# Patient Record
Sex: MALE | Race: WHITE | NOT HISPANIC OR LATINO | Employment: OTHER | URBAN - METROPOLITAN AREA
[De-identification: names, ages, dates, MRNs, and addresses within clinical notes are randomized per-mention and may not be internally consistent; named-entity substitution may affect disease eponyms.]

---

## 2017-01-24 ENCOUNTER — TRANSFERRED RECORDS (OUTPATIENT)
Dept: HEALTH INFORMATION MANAGEMENT | Facility: CLINIC | Age: 42
End: 2017-01-24

## 2017-05-09 ENCOUNTER — OFFICE VISIT (OUTPATIENT)
Dept: FAMILY MEDICINE | Facility: CLINIC | Age: 42
End: 2017-05-09
Payer: MEDICAID

## 2017-05-09 VITALS
BODY MASS INDEX: 24.66 KG/M2 | TEMPERATURE: 96.9 F | WEIGHT: 134 LBS | HEART RATE: 74 BPM | SYSTOLIC BLOOD PRESSURE: 117 MMHG | HEIGHT: 62 IN | DIASTOLIC BLOOD PRESSURE: 77 MMHG

## 2017-05-09 DIAGNOSIS — E55.9 VITAMIN D DEFICIENCY: ICD-10-CM

## 2017-05-09 DIAGNOSIS — Z00.00 ROUTINE GENERAL MEDICAL EXAMINATION AT A HEALTH CARE FACILITY: Primary | ICD-10-CM

## 2017-05-09 LAB
BASOPHILS # BLD AUTO: 0 10E9/L (ref 0–0.2)
BASOPHILS NFR BLD AUTO: 0.2 %
DIFFERENTIAL METHOD BLD: ABNORMAL
EOSINOPHIL # BLD AUTO: 0.1 10E9/L (ref 0–0.7)
EOSINOPHIL NFR BLD AUTO: 1.7 %
ERYTHROCYTE [DISTWIDTH] IN BLOOD BY AUTOMATED COUNT: 11.5 % (ref 10–15)
HCT VFR BLD AUTO: 39.6 % (ref 40–53)
HGB BLD-MCNC: 13.6 G/DL (ref 13.3–17.7)
LYMPHOCYTES # BLD AUTO: 1.6 10E9/L (ref 0.8–5.3)
LYMPHOCYTES NFR BLD AUTO: 34.4 %
MCH RBC QN AUTO: 33.1 PG (ref 26.5–33)
MCHC RBC AUTO-ENTMCNC: 34.3 G/DL (ref 31.5–36.5)
MCV RBC AUTO: 96 FL (ref 78–100)
MONOCYTES # BLD AUTO: 0.5 10E9/L (ref 0–1.3)
MONOCYTES NFR BLD AUTO: 11.3 %
NEUTROPHILS # BLD AUTO: 2.5 10E9/L (ref 1.6–8.3)
NEUTROPHILS NFR BLD AUTO: 52.4 %
PLATELET # BLD AUTO: 136 10E9/L (ref 150–450)
RBC # BLD AUTO: 4.11 10E12/L (ref 4.4–5.9)
WBC # BLD AUTO: 4.7 10E9/L (ref 4–11)

## 2017-05-09 PROCEDURE — 99396 PREV VISIT EST AGE 40-64: CPT | Performed by: FAMILY MEDICINE

## 2017-05-09 PROCEDURE — 83721 ASSAY OF BLOOD LIPOPROTEIN: CPT | Performed by: FAMILY MEDICINE

## 2017-05-09 PROCEDURE — 84443 ASSAY THYROID STIM HORMONE: CPT | Performed by: FAMILY MEDICINE

## 2017-05-09 PROCEDURE — 80048 BASIC METABOLIC PNL TOTAL CA: CPT | Performed by: FAMILY MEDICINE

## 2017-05-09 PROCEDURE — 84439 ASSAY OF FREE THYROXINE: CPT | Performed by: FAMILY MEDICINE

## 2017-05-09 PROCEDURE — 85025 COMPLETE CBC W/AUTO DIFF WBC: CPT | Performed by: FAMILY MEDICINE

## 2017-05-09 PROCEDURE — 36415 COLL VENOUS BLD VENIPUNCTURE: CPT | Performed by: FAMILY MEDICINE

## 2017-05-09 RX ORDER — CHOLECALCIFEROL (VITAMIN D3) 50 MCG
2000 TABLET ORAL DAILY
Qty: 100 TABLET | Refills: 3 | Status: SHIPPED | OUTPATIENT
Start: 2017-05-09

## 2017-05-09 NOTE — PATIENT INSTRUCTIONS
1. I recommend including veggies, fresh fruits (3 to 5 servings), nuts (unsalted) in your daily diet. Cut down on carbs like bread, pasta, rice, potatoes. Limit red meats like burgers etc. Increase healthy proteins like beans, tofu, tuna, chicken and turkey.    2. Continue walking daily.     3. Apply Mineral Oil 2 drops each ear 3 times per week.    4. Avoid the sun or otherwise use sun screen - please look at the pamphlet I gave you about melanoma.    5. Have BB see the dentist and eye doctor yearly.    6. Find the vaccine records and mail them to me or drop them off at the .    7. Have BB take Vitamin D 2000 units daily.     8. I will mail the results of the blood test. I all is well, let me see BB in one year.

## 2017-05-09 NOTE — MR AVS SNAPSHOT
After Visit Summary   5/9/2017    Teto Stephens    MRN: [id number]           Patient Information     Date Of Birth          1975        Visit Information        Provider Department      5/9/2017 5:00 PM Carlitos Gtz MD [facility]        Today's Diagnoses     Routine general medical examination at a health care facility    -  1    Vitamin D deficiency          Care Instructions    1. I recommend including veggies, fresh fruits (3 to 5 servings), nuts (unsalted) in your daily diet. Cut down on carbs like bread, pasta, rice, potatoes. Limit red meats like burgers etc. Increase healthy proteins like beans, tofu, tuna, chicken and turkey.    2. Continue walking daily.     3. Apply Mineral Oil 2 drops each ear 3 times per week.    4. Avoid the sun or otherwise use sun screen - please look at the pamphlet I gave you about melanoma.    5. Have BB see the dentist and eye doctor yearly.    6. Find the vaccine records and mail them to me or drop them off at the .    7. Have BB take Vitamin D 2000 units daily.     8. I will mail the results of the blood test. I all is well, let me see BB in one year.           Follow-ups after your visit        Who to contact     If you have questions or need follow up information about today's clinic visit or your schedule please contact [facility] directly at [phone].  Normal or non-critical lab and imaging results will be communicated to you by MyChart, letter or phone within 4 business days after the clinic has received the results. If you do not hear from us within 7 days, please contact the clinic through MyChart or phone. If you have a critical or abnormal lab result, we will notify you by phone as soon as possible.  Submit refill requests through [employer] or call your pharmacy and they will forward the refill request to us. Please allow 3 business days for your refill to be completed.          Additional Information  "About Your Visit        [employer] Information     [employer] lets you send messages to your doctor, view your test results, renew your prescriptions, schedule appointments and more. To sign up, go to [email or web address] . Click on \"Log in\" on the left side of the screen, which will take you to the Welcome page. Then click on \"Sign up Now\" on the right side of the page.     You will be asked to enter the access code listed below, as well as some personal information. Please follow the directions to create your username and password.     Your access code is: [email or web address]  Expires: 2017  5:21 PM     Your access code will  in 90 days. If you need help or a new code, please call your [facility] or [phone].        Care EveryWhere ID     This is your Care EveryWhere ID. This could be used by other organizations to access your [facility] medical records  [id number]        Your Vitals Were     Pulse Temperature Height BMI (Body Mass Index)          74 96.9  F (36.1  C) (Tympanic) 5' 2.25\" (1.581 m) 24.31 kg/m2         Blood Pressure from Last 3 Encounters:   17 117/77   16 130/80   08/14/15 120/58    Weight from Last 3 Encounters:   17 134 lb (60.8 kg)   16 133 lb (60.3 kg)   08/14/15 133 lb (60.3 kg)              We Performed the Following     Basic metabolic panel     CBC with platelets differential     LDL cholesterol direct     TSH with free T4 reflex          Where to get your medicines      These medications were sent to [facility] [address] [facility]  [place] [facility] AT [address]   [place] [facility] [address]     Phone:  [phone]     vitamin D 2000 UNITS tablet          Primary Care Provider Office Phone # Fax #    [facility] [phone]       [address] [facility]. [facility] [address]        Thank you!     Thank you for choosing [facility]  for your care. Our goal is always to " provide you with excellent care. Hearing back from our patients is one way we can continue to improve our services. Please take a few minutes to complete the written survey that you may receive in the mail after your visit with us. Thank you!             Your Updated Medication List - Protect others around you: Learn how to safely use, store and throw away your medicines at www.disposemymeds.org.          This list is accurate as of: 5/9/17  5:21 PM.  Always use your most recent med list.                   Brand Name Dispense Instructions for use    carBAMazepine 100 MG chewable tablet    TEGretol     100 mg 2 times daily 4 tablets twice daily.       cloNIDine 0.2 MG tablet    CATAPRES     Take 0.2 mg by mouth 3 times daily.       * DEPAKOTE 500 MG EC tablet   Generic drug:  divalproex      Take 500 mg by mouth 3 times daily. 625mg at noon       * divalproex 125 MG EC tablet    DEPAKOTE     125 mg Take 1 tablet once daily along with 500mg       LORazepam 1 MG tablet    ATIVAN     Take 1 mg by mouth 3 times daily.       NEW MED      Carbmepazine 100mg 4 bid       risperDAL 0.25 MG tablet   Generic drug:  risperiDONE      Take 0.25 mg by mouth 2 times daily.       vitamin D 2000 UNITS tablet     100 tablet    Take 2,000 Units by mouth daily       * Notice:  This list has 2 medication(s) that are the same as other medications prescribed for you. Read the directions carefully, and ask your doctor or other care provider to review them with you.

## 2017-05-09 NOTE — PROGRESS NOTES
SUBJECTIVE:     CC: Teto Stephens (AKA BB) is an 42 year old male who presents for preventative health visit.     Here with foster mom (his brother's wife). But he has adoptive mom who is involved in his care daily.    Autism, MR - non verbal. Needs full cares except he dresses himself. Generally cooperative, but sometimes can be challenging if he is agitated. Has certain behaviors like repetitive motions. Follows with psychiatry is on Ativan, Clonidine and Risperdal.    Dental infection - he had tooth extraction under anesthesia on 3/31/16.     Seizure d/o - follows with neurology. On Tegretol and Depakote.    H/O Diabetes Insipidus - previously on Lithium. Now resolved.    Ear wax - irrigated successfully previously. Counseled on using Debrox or Mineral oil to prevent recurrence.     Low Vit D - this was low on 5/12/14. Advised 2000 units daily.    Thrombocytopenia - this has been mild. Likely ITP. No issues with easy bruising or bleeding. No need for further investigation except follow periodically.    CBC RESULTS:   Recent Labs   Lab Test  03/18/16   1224   WBC  5.3   RBC  4.27*   HGB  14.0   HCT  40.8   MCV  96   MCH  32.8   MCHC  34.3   RDW  11.7   PLT  142*       CBC RESULTS:   Recent Labs    Lab Test  05/12/14  0825    WBC  4.2    RBC  4.29*    HGB  13.8    HCT  41.5    MCV  97    MCH  32.2    MCHC  33.3    RDW  12.3    PLT  134*        Preventive:    We don't have vaccine records yet but his foster mom says his vaccines are up to date. She will forward this from home.    Lipids:   Recent Labs    Lab Test  05/12/14  0825  05/18/13  1030    CHOL  162  179    HDL  54  53    LDL  93  104    TRIG  70  108    CHOLHDLRATIO  3.0  3.4      Diabetes test:     Last Basic Metabolic Panel:  NA      143   5/13/2015   POTASSIUM      4.0   5/13/2015  CHLORIDE      107   5/13/2015  CLAUDIO      9.4   5/13/2015  CO2       27   5/13/2015  BUN       12   5/13/2015  CR     0.82   5/13/2015  GLC       95    "5/13/2015    Lipids screen:    Recent Labs   Lab Test  05/12/14   0825  05/18/13   1030   CHOL  162  179   HDL  54  53   LDL  93  104   TRIG  70  108   CHOLHDLRATIO  3.0  3.4       Advanced Directive: discussed but declined    Today's PHQ-2 Score:   PHQ-2 ( 1999 Pf[employer]) 5/9/2017 5/13/2015   Q1: Little interest or pleasure in doing things 0 0   Q2: Feeling down, depressed or hopeless 0 0   PHQ-2 Score 0 0       Abuse: Current or Past(Physical, Sexual or Emotional)- No  Do you feel safe in your environment - Yes    Social History   Substance Use Topics     Smoking status: Never Smoker     Smokeless tobacco: Never Used     Alcohol use No     The patient does not drink >3 drinks per day nor >7 drinks per week.    Last PSA: No results found for: PSA    Recent Labs   Lab Test  05/12/14   0825  05/18/13   1030   CHOL  162  179   HDL  54  53   LDL  93  104   TRIG  70  108   CHOLHDLRATIO  3.0  3.4       Reviewed orders with patient. Reviewed health maintenance and updated orders accordingly - Yes    Reviewed and updated as needed this visit by clinical staff         Reviewed and updated as needed this visit by Provider            ROS:  C: NEGATIVE for fever, chills, change in weight  I: NEGATIVE for worrisome rashes, moles or lesions  E: NEGATIVE for vision changes or irritation  ENT: NEGATIVE for ear, mouth and throat problems  R: NEGATIVE for significant cough or SOB  CV: NEGATIVE for chest pain, palpitations or peripheral edema  GI: NEGATIVE for nausea, abdominal pain, heartburn, or change in bowel habits   male: negative for dysuria, hematuria, decreased urinary stream, erectile dysfunction, urethral discharge  M: NEGATIVE for significant arthralgias or myalgia  N: NEGATIVE for weakness, dizziness or paresthesias  P: NEGATIVE for changes in mood or affect      OBJECTIVE:     /77 (Cuff Size: Adult Regular)  Pulse 74  Temp 96.9  F (36.1  C) (Tympanic)  Ht 5' 2.25\" (1.581 m)  Wt 134 lb (60.8 kg)  BMI 24.31 " "kg/m2  EXAM:  GENERAL APPEARANCE: autistic patient, non verbal, with typical mannerism, cooperative in no distress. Here with his foster mom and brother - they are good about calming him down.   EYES: Eyes grossly normal to inspection, PERRL and conjunctivae and sclerae normal. There is strabismus.  HENT: ear canals with minimal wax and TM's normal, nose and mouth without ulcers or lesions  NECK: no adenopathy, no asymmetry, masses, or scars and thyroid normal to palpation  RESP: lungs clear to auscultation - no rales, rhonchi or wheezes  CV: regular rate and rhythm, normal S1 S2, no S3 or S4, no murmur, click or rub, no peripheral edema and peripheral pulses strong  ABDOMEN: soft, nontender, no hepatosplenomegaly, no masses and bowel sounds normal   (male): normal male genitalia without lesions or urethral discharge, no hernia  MS: no gross musculoskeletal defects noted, no edema  SKIN: no suspicious lesions or rashes  NEURO: Normal strength and tone, mentation intact and speech normal  NEURO: autistic patient, non verbal, with typical mannerism, cooperative in no distress.  PSYCH: mentation appears at baseline, affect normal.      ASSESSMENT/PLAN:         COUNSELING:  Reviewed preventive health counseling, as reflected in patient instructions       Regular exercise       Healthy diet/nutrition       reports that he has never smoked. He has never used smokeless tobacco.    Estimated body mass index is 24.33 kg/(m^2) as calculated from the following:    Height as of 3/18/16: 5' 2\" (1.575 m).    Weight as of 3/18/16: 133 lb (60.3 kg).         Assessment and Plan - Decision Making    1. Routine general medical examination at a health care facility    Results of today's visit given to patient's foster mom verbally and written instructions given. Various age appropriate preventive issues discussed.     - TSH with free T4 reflex  - CBC with platelets differential  - Basic metabolic panel  - LDL cholesterol direct    2. " Vitamin D deficiency    - Cholecalciferol (VITAMIN D) 2000 UNITS tablet; Take 2,000 Units by mouth daily  Dispense: 100 tablet; Refill: 3      Written instructions given as follows:    Patient Instructions   1. I recommend including veggies, fresh fruits (3 to 5 servings), nuts (unsalted) in your daily diet. Cut down on carbs like bread, pasta, rice, potatoes. Limit red meats like burgers etc. Increase healthy proteins like beans, tofu, tuna, chicken and turkey.    2. Continue walking daily.     3. Apply Mineral Oil 2 drops each ear 3 times per week.    4. Avoid the sun or otherwise use sun screen - please look at the pamphlet I gave you about melanoma.    5. Have BB see the dentist and eye doctor yearly.    6. Find the vaccine records and mail them to me or drop them off at the .    7. Have BB take Vitamin D 2000 units daily.     8. I will mail the results of the blood test. I all is well, let me see BB in one year.

## 2017-05-09 NOTE — NURSING NOTE
"Chief Complaint   Patient presents with     Physical       Initial /77 (Cuff Size: Adult Regular)  Pulse 74  Temp 96.9  F (36.1  C) (Tympanic)  Ht 5' 2.25\" (1.581 m)  Wt 134 lb (60.8 kg)  BMI 24.31 kg/m2 Estimated body mass index is 24.31 kg/(m^2) as calculated from the following:    Height as of this encounter: 5' 2.25\" (1.581 m).    Weight as of this encounter: 134 lb (60.8 kg).  Medication Reconciliation: complete    Chandrika Orantes LPN    "

## 2017-05-10 LAB
ANION GAP SERPL CALCULATED.3IONS-SCNC: 8 MMOL/L (ref 3–14)
BUN SERPL-MCNC: 19 MG/DL (ref 7–30)
CALCIUM SERPL-MCNC: 8.7 MG/DL (ref 8.5–10.1)
CHLORIDE SERPL-SCNC: 106 MMOL/L (ref 94–109)
CO2 SERPL-SCNC: 27 MMOL/L (ref 20–32)
CREAT SERPL-MCNC: 0.83 MG/DL (ref 0.66–1.25)
GFR SERPL CREATININE-BSD FRML MDRD: NORMAL ML/MIN/1.7M2
GLUCOSE SERPL-MCNC: 80 MG/DL (ref 70–99)
LDLC SERPL DIRECT ASSAY-MCNC: 97 MG/DL
POTASSIUM SERPL-SCNC: 4.2 MMOL/L (ref 3.4–5.3)
SODIUM SERPL-SCNC: 141 MMOL/L (ref 133–144)
T4 FREE SERPL-MCNC: 0.62 NG/DL (ref 0.76–1.46)
TSH SERPL DL<=0.005 MIU/L-ACNC: 4.64 MU/L (ref 0.4–4)

## 2017-05-11 ENCOUNTER — TELEPHONE (OUTPATIENT)
Dept: FAMILY MEDICINE | Facility: CLINIC | Age: 42
End: 2017-05-11

## 2017-05-11 DIAGNOSIS — E03.9 HYPOTHYROIDISM, UNSPECIFIED TYPE: Primary | ICD-10-CM

## 2017-05-11 NOTE — TELEPHONE ENCOUNTER
Please call his Foster Mom (also his sister-in-law).    The thyroid was low. Please set up an appointment with endocrinology - [place] [phone]. This is not urgent so it doesn't have to be right away.    All other labs were stable compared to previous.    Carlitos Gtz M.D.

## 2017-07-25 ENCOUNTER — TRANSFERRED RECORDS (OUTPATIENT)
Dept: HEALTH INFORMATION MANAGEMENT | Facility: CLINIC | Age: 42
End: 2017-07-25

## 2017-08-23 ENCOUNTER — TELEPHONE (OUTPATIENT)
Dept: FAMILY MEDICINE | Facility: CLINIC | Age: 42
End: 2017-08-23

## 2017-08-23 NOTE — TELEPHONE ENCOUNTER
If the thyroid test was normal in July. No need to repeat. No need to see the endocrinologist.    Let's repeat the test when I see him again in 2018 for his routine physical.    Carlitos Gtz M.D.

## 2017-08-23 NOTE — TELEPHONE ENCOUNTER
Pt  calling in regards to a thyroid level. His psych did a level and it came out normal in July 27th , Was wondering if cesar would want to re due his level before he see a endocrinologist,.

## 2018-01-19 ENCOUNTER — TRANSFERRED RECORDS (OUTPATIENT)
Dept: HEALTH INFORMATION MANAGEMENT | Facility: CLINIC | Age: 43
End: 2018-01-19

## 2018-02-21 ENCOUNTER — TELEPHONE (OUTPATIENT)
Dept: FAMILY MEDICINE | Facility: CLINIC | Age: 43
End: 2018-02-21

## 2018-02-21 ENCOUNTER — ALLIED HEALTH/NURSE VISIT (OUTPATIENT)
Dept: NURSING | Facility: CLINIC | Age: 43
End: 2018-02-21
Payer: MEDICAID

## 2018-02-21 DIAGNOSIS — Z23 NEED FOR VACCINATION: Primary | ICD-10-CM

## 2018-02-21 PROCEDURE — 90471 IMMUNIZATION ADMIN: CPT

## 2018-02-21 PROCEDURE — 99207 ZZC NO CHARGE LOS: CPT

## 2018-02-21 PROCEDURE — 90715 TDAP VACCINE 7 YRS/> IM: CPT

## 2018-02-21 NOTE — TELEPHONE ENCOUNTER
Bridgett is calling would like to know how she would go about doing a hearing and vision test on patient since he is severely DD. Please call to discuss. Thank you.

## 2018-02-21 NOTE — TELEPHONE ENCOUNTER
Bridgett falcon parent is wondering the dates patient had Hep A/B and tetnas shot  Please call to advice  Thank you

## 2018-02-21 NOTE — PROGRESS NOTES
Screening Questionnaire for Adult Immunization    Are you sick today?   No   Do you have allergies to medications, food, a vaccine component or latex?   No   Have you ever had a serious reaction after receiving a vaccination?   No   Do you have a long-term health problem with heart disease, lung disease, asthma, kidney disease, metabolic disease (e.g. diabetes), anemia, or other blood disorder?   No   Do you have cancer, leukemia, HIV/AIDS, or any other immune system problem?   No   In the past 3 months, have you taken medications that affect  your immune system, such as prednisone, other steroids, or anticancer drugs; drugs for the treatment of rheumatoid arthritis, Crohn s disease, or psoriasis; or have you had radiation treatments?   No   Have you had a seizure, or a brain or other nervous system problem?   No   During the past year, have you received a transfusion of blood or blood     products, or been given immune (gamma) globulin or antiviral drug?   No   For women: Are you pregnant or is there a chance you could become        pregnant during the next month?   No   Have you received any vaccinations in the past 4 weeks?   No     Immunization questionnaire answers were all negative.        Per orders of Dr. YEE, injection of TDAP given by Annalee Jordan. Patient instructed to remain in clinic for 15 minutes afterwards, and to report any adverse reaction to me immediately.       Screening performed by Annalee Jordan on 2/21/2018 at 4:00 PM.

## 2018-02-21 NOTE — MR AVS SNAPSHOT
"              After Visit Summary   2018    Teto Stephens    MRN: [id number]           Patient Information     Date Of Birth          1975        Visit Information        Provider Department      2018 3:30 PM AN ANCILLARY [facility]        Today's Diagnoses     Need for vaccination    -  1       Follow-ups after your visit        Who to contact     If you have questions or need follow up information about today's clinic visit or your schedule please contact [facility] directly at [phone].  Normal or non-critical lab and imaging results will be communicated to you by MyChart, letter or phone within 4 business days after the clinic has received the results. If you do not hear from us within 7 days, please contact the clinic through [employer] or phone. If you have a critical or abnormal lab result, we will notify you by phone as soon as possible.  Submit refill requests through [employer] or call your pharmacy and they will forward the refill request to us. Please allow 3 business days for your refill to be completed.          Additional Information About Your Visit        MyChart Information     [employer] lets you send messages to your doctor, view your test results, renew your prescriptions, schedule appointments and more. To sign up, go to [email or web address] . Click on \"Log in\" on the left side of the screen, which will take you to the Welcome page. Then click on \"Sign up Now\" on the right side of the page.     You will be asked to enter the access code listed below, as well as some personal information. Please follow the directions to create your username and password.     Your access code is: [email or web address]  Expires: 2018  4:01 PM     Your access code will  in 90 days. If you need help or a new code, please call your [facility] or [phone].        Care EveryWhere ID     This is your Care EveryWhere ID. This could be used by other organizations to " access your [facility] medical records  [id number]         Blood Pressure from Last 3 Encounters:   05/09/17 117/77   03/18/16 130/80   08/14/15 120/58    Weight from Last 3 Encounters:   05/09/17 134 lb (60.8 kg)   03/18/16 133 lb (60.3 kg)   08/14/15 133 lb (60.3 kg)              We Performed the Following     1st  Administration  [20184]     TDAP VACCINE (ADACEL) [79755.002]        Primary Care Provider Office Phone # Fax #    Carlitos Gtz [phone] [address] [facility] [address]        Equal Access to Services     DEMI MERCEDES : Dorota mcclellano Andre, waricardoda leslye, qaybta kaalmada fer, jamey cantu . So [facility] [phone].    ATENCIÓN: Si habla español, tiene a may disposición servicios gratuitos de asistencia lingüística. Llame al [phone].    We comply with applicable federal civil rights laws and [place] laws. We do not discriminate on the basis of race, color, national origin, age, disability, sex, sexual orientation, or gender identity.            Thank you!     Thank you for choosing [facility]  for your care. Our goal is always to provide you with excellent care. Hearing back from our patients is one way we can continue to improve our services. Please take a few minutes to complete the written survey that you may receive in the mail after your visit with us. Thank you!             Your Updated Medication List - Protect others around you: Learn how to safely use, store and throw away your medicines at www.disposemymeds.org.          This list is accurate as of 2/21/18  4:01 PM.  Always use your most recent med list.                   Brand Name Dispense Instructions for use Diagnosis    carBAMazepine 100 MG chewable tablet    TEGretol     100 mg 2 times daily 4 tablets twice daily.        cloNIDine 0.2 MG tablet    CATAPRES     Take 0.2 mg by mouth 3 times daily.        * DEPAKOTE 500 MG DR tablet   Generic  drug:  divalproex sodium delayed-release      Take 500 mg by mouth 3 times daily. 625mg at noon        * divalproex sodium delayed-release 125 MG DR tablet    DEPAKOTE     125 mg Take 1 tablet once daily along with 500mg        LORazepam 1 MG tablet    ATIVAN     Take 1 mg by mouth 3 times daily.        NEW MED      Carbmepazine 100mg 4 bid        risperDAL 0.25 MG tablet   Generic drug:  risperiDONE      Take 0.25 mg by mouth 2 times daily.        vitamin D 2000 UNITS tablet     100 tablet    Take 2,000 Units by mouth daily    Vitamin D deficiency       * Notice:  This list has 2 medication(s) that are the same as other medications prescribed for you. Read the directions carefully, and ask your doctor or other care provider to review them with you.

## 2018-02-21 NOTE — TELEPHONE ENCOUNTER
Called and informed her that we do not have any records of these immunizations for him. I checked [facility] also and there are only flu shots in there.Elida Birmingham MA/TC

## 2018-02-22 NOTE — TELEPHONE ENCOUNTER
Called and informed Bridgett and gave her the phone numbers to set this up.Elida Birmingham MA/PETER

## 2018-07-31 NOTE — PROGRESS NOTES
"  SUBJECTIVE:   CC: Teto Stephens is an 43 year old male who presents for preventative health visit.     Healthy Habits:    Do you get at least three servings of calcium containing foods daily (dairy, green leafy vegetables, etc.)? {YES/NO, DAIRY INTAKE:015584::\"yes\"}    Amount of exercise or daily activities, outside of work: {AMOUNT EXERCISE:077087}    Problems taking medications regularly {Yes /No default:143141::\"No\"}    Medication side effects: {Yes /No default.:907049::\"No\"}    Have you had an eye exam in the past two years? {YESNOBLANK:142752}    Do you see a dentist twice per year? {YESNOBLANK:401395}    Do you have sleep apnea, excessive snoring or daytime drowsiness?{YESNOBLANK:211765}  {Outside tests to abstract? :084571}     {additional problems to add (Optional):334211}    Today's PHQ-2 Score:   PHQ-2 ( 1999 Pfizer) 5/9/2017 5/13/2015   Q1: Little interest or pleasure in doing things 0 0   Q2: Feeling down, depressed or hopeless 0 0   PHQ-2 Score 0 0     {PHQ-2 LOOK IN ASSESSMENTS :251233}  Abuse: Current or Past(Physical, Sexual or Emotional)- {YES/NO/NA:639346}  Do you feel safe in your environment - {YES/NO/NA:844704}    Social History   Substance Use Topics     Smoking status: Never Smoker     Smokeless tobacco: Never Used     Alcohol use No      If you drink alcohol do you typically have >3 drinks per day or >7 drinks per week? {ETOH :883638}                      Last PSA: No results found for: PSA    Reviewed orders with patient. Reviewed health maintenance and updated orders accordingly - {Yes/No:298094::\"Yes\"}  {Chronicprobdata (Optional):084977}    Reviewed and updated as needed this visit by clinical staff         Reviewed and updated as needed this visit by Provider        {HISTORY OPTIONS (Optional):668465}    ROS:  { :404665::\"CONSTITUTIONAL: NEGATIVE for fever, chills, change in weight\",\"INTEGUMENTARY/SKIN: NEGATIVE for worrisome rashes, moles or lesions\",\"EYES: NEGATIVE for vision " "changes or irritation\",\"ENT: NEGATIVE for ear, mouth and throat problems\",\"RESP: NEGATIVE for significant cough or SOB\",\"CV: NEGATIVE for chest pain, palpitations or peripheral edema\",\"GI: NEGATIVE for nausea, abdominal pain, heartburn, or change in bowel habits\",\" male: negative for dysuria, hematuria, decreased urinary stream, erectile dysfunction, urethral discharge\",\"MUSCULOSKELETAL: NEGATIVE for significant arthralgias or myalgia\",\"NEURO: NEGATIVE for weakness, dizziness or paresthesias\",\"PSYCHIATRIC: NEGATIVE for changes in mood or affect\"}    OBJECTIVE:   There were no vitals taken for this visit.  EXAM:  {Exam Choices:594448}    {Diagnostic Test Results (Optional):020083::\"Diagnostic Test Results:\",\"none \"}    ASSESSMENT/PLAN:   {Diag Picklist:776448}    COUNSELING:  {MALE COUNSELING MESSAGES:127290::\"Reviewed preventive health counseling, as reflected in patient instructions\"}    BP Readings from Last 1 Encounters:   05/09/17 117/77     Estimated body mass index is 24.31 kg/(m^2) as calculated from the following:    Height as of 5/9/17: 5' 2.25\" (1.581 m).    Weight as of 5/9/17: 134 lb (60.8 kg).    {BP Counseling- Complete if BP >= 120/80  (Optional):416236}  {Weight Management Plan (ACO) Complete if BMI is abnormal-  Ages 18-64  BMI >24.9.  Age 65+ with BMI <23 or >30 (Optional):260205}     reports that he has never smoked. He has never used smokeless tobacco.  {Tobacco Cessation -- Complete if patient is a smoker (Optional):948641}    Counseling Resources:  ATP IV Guidelines  Pooled Cohorts Equation Calculator  FRAX Risk Assessment  ICSI Preventive Guidelines  Dietary Guidelines for Americans, 2010  USDA's MyPlate  ASA Prophylaxis  Lung CA Screening    Shay Rene PA-C  [facility]  "

## 2018-08-01 ENCOUNTER — OFFICE VISIT (OUTPATIENT)
Dept: FAMILY MEDICINE | Facility: CLINIC | Age: 43
End: 2018-08-01
Payer: MEDICAID

## 2018-08-01 VITALS
WEIGHT: 131 LBS | HEART RATE: 110 BPM | SYSTOLIC BLOOD PRESSURE: 150 MMHG | DIASTOLIC BLOOD PRESSURE: 80 MMHG | BODY MASS INDEX: 23.77 KG/M2 | OXYGEN SATURATION: 98 %

## 2018-08-01 DIAGNOSIS — R62.50 DEVELOPMENT DELAY: ICD-10-CM

## 2018-08-01 DIAGNOSIS — F84.0 AUTISM: ICD-10-CM

## 2018-08-01 DIAGNOSIS — R03.0 ELEVATED BLOOD PRESSURE READING WITHOUT DIAGNOSIS OF HYPERTENSION: ICD-10-CM

## 2018-08-01 DIAGNOSIS — Z00.00 ROUTINE GENERAL MEDICAL EXAMINATION AT A HEALTH CARE FACILITY: Primary | ICD-10-CM

## 2018-08-01 PROCEDURE — 99396 PREV VISIT EST AGE 40-64: CPT | Performed by: PHYSICIAN ASSISTANT

## 2018-08-01 ASSESSMENT — ENCOUNTER SYMPTOMS
HEMATURIA: 0
CHILLS: 0
DIARRHEA: 0
COUGH: 0
CONSTIPATION: 0
DIZZINESS: 0
HEMATOCHEZIA: 0
ABDOMINAL PAIN: 0
EYE PAIN: 0

## 2018-08-01 NOTE — PROGRESS NOTES
SUBJECTIVE:   CC: Teto Stephens is an 43 year old male who presents for preventative health visit.     Physical   Annual:     Getting at least 3 servings of Calcium per day:  Yes    Bi-annual eye exam:  NO    Dental care twice a year:  NO    Sleep apnea or symptoms of sleep apnea:  None    Diet:  Regular (no restrictions)    Taking medications regularly:  Yes    Medication side effects:  None    Additional concerns today:  No    No concerns - person with pt unsure if anything will be able to be done today as pt is really agitated - was mainly concerned that tetanus was up to date which it is. Blood pressure is elevated but pt would not/will not sit still due to aggravation - is normally in normal range     See's psychiatry for mental heal and autism.     Today's PHQ-2 Score:   PHQ-2 ( 1999 Pfizer) 8/1/2018   Q1: Little interest or pleasure in doing things 0   Q2: Feeling down, depressed or hopeless 0   PHQ-2 Score 0   Q1: Little interest or pleasure in doing things Not at all   Q2: Feeling down, depressed or hopeless Not at all   PHQ-2 Score 0       Abuse: Current or Past(Physical, Sexual or Emotional)- No  Do you feel safe in your environment -    Social History   Substance Use Topics     Smoking status: Never Smoker     Smokeless tobacco: Never Used     Alcohol use No     Alcohol Use 8/1/2018   If you drink alcohol do you typically have greater than 3 drinks per day OR greater than 7 drinks per week? No       Last PSA: No results found for: PSA    Reviewed orders with patient. Reviewed health maintenance and updated orders accordingly - Yes  Labs reviewed in EPIC  BP Readings from Last 3 Encounters:   08/01/18 150/80   05/09/17 117/77   03/18/16 130/80    Wt Readings from Last 3 Encounters:   08/01/18 131 lb (59.4 kg)   05/09/17 134 lb (60.8 kg)   03/18/16 133 lb (60.3 kg)                  Patient Active Problem List   Diagnosis     Autism     Development delay     CARDIOVASCULAR SCREENING; LDL GOAL LESS  THAN 160     Impacted cerumen     Vitamin D deficiency     Dental abscess     Thrombocytopenia (HCC)     Hypothyroidism, unspecified type     Elevated blood pressure reading without diagnosis of hypertension     History reviewed. No pertinent surgical history.    Social History   Substance Use Topics     Smoking status: Never Smoker     Smokeless tobacco: Never Used     Alcohol use No     Family History   Problem Relation Age of Onset     Unknown/Adopted Mother      Unknown/Adopted Father      Unknown/Adopted Maternal Grandmother      Unknown/Adopted Maternal Grandfather      Unknown/Adopted Paternal Grandmother      Unknown/Adopted Paternal Grandfather      Unknown/Adopted Brother      Unknown/Adopted Sister          Current Outpatient Prescriptions   Medication Sig Dispense Refill     carBAMazepine (TEGRETOL) 100 MG chewable tablet 100 mg 2 times daily 4 tablets twice daily.  8     Cholecalciferol (VITAMIN D) 2000 UNITS tablet Take 2,000 Units by mouth daily 100 tablet 3     cloNIDINE (CATAPRES) 0.2 MG tablet Take 0.2 mg by mouth 3 times daily.       divalproex (DEPAKOTE) 125 MG EC tablet 125 mg Take 1 tablet once daily along with 500mg  11     divalproex (DEPAKOTE) 500 MG EC tablet Take 500 mg by mouth 3 times daily. 625mg at noon         lorazepam (ATIVAN) 1 MG tablet Take 1 mg by mouth 3 times daily.       risperidone (RISPERDAL) 0.25 MG tablet Take 0.25 mg by mouth 2 times daily.       No Known Allergies    Reviewed and updated as needed this visit by clinical staff  Tobacco  Allergies  Meds  Med Hx  Surg Hx  Fam Hx  Soc Hx        Reviewed and updated as needed this visit by Provider          History reviewed. No pertinent past medical history.   History reviewed. No pertinent surgical history.    Review of Systems   Constitutional: Negative for chills.   HENT: Negative for congestion and ear pain.    Eyes: Negative for pain.   Respiratory: Negative for cough.    Cardiovascular: Negative for chest pain.    Gastrointestinal: Negative for abdominal pain, constipation, diarrhea and hematochezia.   Genitourinary: Negative for hematuria.   Neurological: Negative for dizziness.     CONSTITUTIONAL: NEGATIVE for fever, chills, change in weight  INTEGUMENTARY/SKIN: NEGATIVE for worrisome rashes, moles or lesions  EYES: NEGATIVE for vision changes or irritation  ENT: NEGATIVE for ear, mouth and throat problems  RESP: NEGATIVE for significant cough or SOB  CV: NEGATIVE for chest pain, palpitations or peripheral edema  GI: NEGATIVE for nausea, abdominal pain, heartburn, or change in bowel habits   male: negative for dysuria, hematuria, decreased urinary stream, erectile dysfunction, urethral discharge  MUSCULOSKELETAL: NEGATIVE for significant arthralgias or myalgia  NEURO: NEGATIVE for weakness, dizziness or paresthesias  PSYCHIATRIC: NEGATIVE for changes in mood or affect    OBJECTIVE:   /80  Pulse 110  Wt 131 lb (59.4 kg)  SpO2 98%  BMI 23.77 kg/m2    Physical Exam  GENERAL: alert and no distress  RESP: lungs clear to auscultation - no rales, rhonchi or wheezes  CV: regular rate and rhythm, normal S1 S2, no S3 or S4, no murmur, click or rub, no peripheral edema and peripheral pulses strong  MS: no gross musculoskeletal defects noted, no edema    Diagnostic Test Results:  none     ASSESSMENT/PLAN:       ICD-10-CM    1. Routine general medical examination at a health care facility Z00.00    2. Autism F84.0    3. Development delay R62.50    4. Elevated blood pressure reading without diagnosis of hypertension R03.0    pt very agitated to day to full exam was not possible.   Discussed elevated bp and to recheck in 1 months.     COUNSELING:   Reviewed preventive health counseling, as reflected in patient instructions       Regular exercise       Healthy diet/nutrition    BP Readings from Last 1 Encounters:   08/01/18 150/80     Estimated body mass index is 23.77 kg/(m^2) as calculated from the following:    Height as  "of 5/9/17: 5' 2.25\" (1.581 m).    Weight as of this encounter: 131 lb (59.4 kg).    BP Screening:   Last 3 BP Readings:    BP Readings from Last 3 Encounters:   08/01/18 150/80   05/09/17 117/77   03/18/16 130/80       The following was recommended to the patient:  Re-screen within 4 weeks and recommend lifestyle modifications       reports that he has never smoked. He has never used smokeless tobacco.      Counseling Resources:  ATP IV Guidelines  Pooled Cohorts Equation Calculator  FRAX Risk Assessment  ICSI Preventive Guidelines  Dietary Guidelines for Americans, 2010  USDA's MyPlate  ASA Prophylaxis  Lung CA Screening    Shay Rene PA-C  [facility]  Answers for HPI/ROS submitted by the patient on 8/1/2018   PHQ-2 Score: 0    "

## 2018-08-01 NOTE — NURSING NOTE
"Chief Complaint   Patient presents with     Physical     Health Maintenance     HIV, PHQ2       Initial BP (!) 158/97  Pulse 110  Wt 131 lb (59.4 kg)  SpO2 98%  BMI 23.77 kg/m2 Estimated body mass index is 23.77 kg/(m^2) as calculated from the following:    Height as of 5/9/17: 5' 2.25\" (1.581 m).    Weight as of this encounter: 131 lb (59.4 kg).  Medication Reconciliation: complete  Natasha Le CMA    "

## 2018-08-01 NOTE — MR AVS SNAPSHOT
After Visit Summary   8/1/2018    Teto Stephens    MRN: [id number]           Patient Information     Date Of Birth          1975        Visit Information        Provider Department      8/1/2018 1:30 PM Shay Rene PA-C [facility] [place]        Today's Diagnoses     Routine general medical examination at a health care facility    -  1      Care Instructions      Preventive Health Recommendations  Male Ages 40 to 49    Yearly exam:             See your health care provider every year in order to  o   Review health changes.   o   Discuss preventive care.    o   Review your medicines if your doctor has prescribed any.    You should be tested each year for STDs (sexually transmitted diseases) if you re at risk.     Have a cholesterol test every 5 years.     Have a colonoscopy (test for colon cancer) if someone in your family has had colon cancer or polyps before age 50.     After age 45, have a diabetes test (fasting glucose). If you are at risk for diabetes, you should have this test every 3 years.      Talk with your health care provider about whether or not a prostate cancer screening test (PSA) is right for you.    Shots: Get a flu shot each year. Get a tetanus shot every 10 years.     Nutrition:    Eat at least 5 servings of fruits and vegetables daily.     Eat whole-grain bread, whole-wheat pasta and brown rice instead of white grains and rice.     Get adequate Calcium and Vitamin D.     Lifestyle    Exercise for at least 150 minutes a week (30 minutes a day, 5 days a week). This will help you control your weight and prevent disease.     Limit alcohol to one drink per day.     No smoking.     Wear sunscreen to prevent skin cancer.     See your dentist every six months for an exam and cleaning.              Follow-ups after your visit        Who to contact     If you have questions or need follow up information about today's clinic visit or your schedule please contact  [facility] directly at [phone].  Normal or non-critical lab and imaging results will be communicated to you by MyChart, letter or phone within 4 business days after the clinic has received the results. If you do not hear from us within 7 days, please contact the clinic through MyChart or phone. If you have a critical or abnormal lab result, we will notify you by phone as soon as possible.  Submit refill requests through [employer] or call your pharmacy and they will forward the refill request to us. Please allow 3 business days for your refill to be completed.          Additional Information About Your Visit        Care EveryWhere ID     This is your Care EveryWhere ID. This could be used by other organizations to access your [place] medical records  [id number]        Your Vitals Were     Pulse Pulse Oximetry BMI (Body Mass Index)             110 98% 23.77 kg/m2          Blood Pressure from Last 3 Encounters:   08/01/18 150/80   05/09/17 117/77   03/18/16 130/80    Weight from Last 3 Encounters:   08/01/18 131 lb (59.4 kg)   05/09/17 134 lb (60.8 kg)   03/18/16 133 lb (60.3 kg)              Today, you had the following     No orders found for display       Primary Care Provider Office Phone # Fax #    Carlitos Gtz [phone] [address] [facility] [address]        Equal Access to Services     DEMI MERCEDES : Hadii dima mcclellano Sofranklyn, waaxda luqadaha, qaybta kaalmada fer, jamey cantu . So [facility] [phone].    ATENCIÓN: Si habla español, tiene a may disposición servicios gratuitos de asistencia lingüística. Emilia al [phone].    We comply with applicable federal civil rights laws and [place] laws. We do not discriminate on the basis of race, color, national origin, age, disability, sex, sexual orientation, or gender identity.            Thank you!     Thank you for choosing [facility]  for your care. Our goal  is always to provide you with excellent care. Hearing back from our patients is one way we can continue to improve our services. Please take a few minutes to complete the written survey that you may receive in the mail after your visit with us. Thank you!             Your Updated Medication List - Protect others around you: Learn how to safely use, store and throw away your medicines at www.disposemymeds.org.          This list is accurate as of 8/1/18  1:53 PM.  Always use your most recent med list.                   Brand Name Dispense Instructions for use Diagnosis    carBAMazepine 100 MG chewable tablet    TEGretol     100 mg 2 times daily 4 tablets twice daily.        cloNIDine 0.2 MG tablet    CATAPRES     Take 0.2 mg by mouth 3 times daily.        * DEPAKOTE 500 MG DR tablet   Generic drug:  divalproex sodium delayed-release      Take 500 mg by mouth 3 times daily. 625mg at noon        * divalproex sodium delayed-release 125 MG DR tablet    DEPAKOTE     125 mg Take 1 tablet once daily along with 500mg        LORazepam 1 MG tablet    ATIVAN     Take 1 mg by mouth 3 times daily.        risperDAL 0.25 MG tablet   Generic drug:  risperiDONE      Take 0.25 mg by mouth 2 times daily.        vitamin D 2000 units tablet     100 tablet    Take 2,000 Units by mouth daily    Vitamin D deficiency       * Notice:  This list has 2 medication(s) that are the same as other medications prescribed for you. Read the directions carefully, and ask your doctor or other care provider to review them with you.

## 2018-08-17 ENCOUNTER — TRANSFERRED RECORDS (OUTPATIENT)
Dept: HEALTH INFORMATION MANAGEMENT | Facility: CLINIC | Age: 43
End: 2018-08-17

## 2018-09-28 ENCOUNTER — OFFICE VISIT (OUTPATIENT)
Dept: FAMILY MEDICINE | Facility: CLINIC | Age: 43
End: 2018-09-28
Payer: MEDICAID

## 2018-09-28 VITALS — HEART RATE: 78 BPM | BODY MASS INDEX: 24.68 KG/M2 | TEMPERATURE: 97.6 F | WEIGHT: 136 LBS | OXYGEN SATURATION: 100 %

## 2018-09-28 DIAGNOSIS — D69.6 THROMBOCYTOPENIA (H): ICD-10-CM

## 2018-09-28 DIAGNOSIS — S40.021A SUPERFICIAL BRUISING OF ARM, RIGHT, INITIAL ENCOUNTER: Primary | ICD-10-CM

## 2018-09-28 LAB
ALBUMIN SERPL-MCNC: 3.3 G/DL (ref 3.4–5)
ALP SERPL-CCNC: 55 U/L (ref 40–150)
ALT SERPL W P-5'-P-CCNC: 18 U/L (ref 0–70)
ANION GAP SERPL CALCULATED.3IONS-SCNC: 6 MMOL/L (ref 3–14)
AST SERPL W P-5'-P-CCNC: 13 U/L (ref 0–45)
BILIRUB SERPL-MCNC: 0.2 MG/DL (ref 0.2–1.3)
BUN SERPL-MCNC: 13 MG/DL (ref 7–30)
CALCIUM SERPL-MCNC: 9 MG/DL (ref 8.5–10.1)
CHLORIDE SERPL-SCNC: 108 MMOL/L (ref 94–109)
CO2 SERPL-SCNC: 29 MMOL/L (ref 20–32)
CREAT SERPL-MCNC: 0.89 MG/DL (ref 0.66–1.25)
ERYTHROCYTE [DISTWIDTH] IN BLOOD BY AUTOMATED COUNT: 11.6 % (ref 10–15)
GFR SERPL CREATININE-BSD FRML MDRD: >90 ML/MIN/1.7M2
GLUCOSE SERPL-MCNC: 93 MG/DL (ref 70–99)
HCT VFR BLD AUTO: 37.7 % (ref 40–53)
HGB BLD-MCNC: 12.8 G/DL (ref 13.3–17.7)
INR PPP: 1.02 (ref 0.86–1.14)
MCH RBC QN AUTO: 33.1 PG (ref 26.5–33)
MCHC RBC AUTO-ENTMCNC: 34 G/DL (ref 31.5–36.5)
MCV RBC AUTO: 97 FL (ref 78–100)
PLATELET # BLD AUTO: 133 10E9/L (ref 150–450)
POTASSIUM SERPL-SCNC: 4.1 MMOL/L (ref 3.4–5.3)
PROT SERPL-MCNC: 6.9 G/DL (ref 6.8–8.8)
RBC # BLD AUTO: 3.87 10E12/L (ref 4.4–5.9)
SODIUM SERPL-SCNC: 143 MMOL/L (ref 133–144)
TSH SERPL DL<=0.005 MIU/L-ACNC: 3.06 MU/L (ref 0.4–4)
VIT B12 SERPL-MCNC: 571 PG/ML (ref 193–986)
WBC # BLD AUTO: 4 10E9/L (ref 4–11)

## 2018-09-28 PROCEDURE — 80053 COMPREHEN METABOLIC PANEL: CPT | Performed by: PHYSICIAN ASSISTANT

## 2018-09-28 PROCEDURE — 85610 PROTHROMBIN TIME: CPT | Performed by: PHYSICIAN ASSISTANT

## 2018-09-28 PROCEDURE — 85027 COMPLETE CBC AUTOMATED: CPT | Performed by: PHYSICIAN ASSISTANT

## 2018-09-28 PROCEDURE — 84443 ASSAY THYROID STIM HORMONE: CPT | Performed by: PHYSICIAN ASSISTANT

## 2018-09-28 PROCEDURE — 82607 VITAMIN B-12: CPT | Performed by: PHYSICIAN ASSISTANT

## 2018-09-28 PROCEDURE — 99213 OFFICE O/P EST LOW 20 MIN: CPT | Performed by: PHYSICIAN ASSISTANT

## 2018-09-28 PROCEDURE — 36415 COLL VENOUS BLD VENIPUNCTURE: CPT | Performed by: PHYSICIAN ASSISTANT

## 2018-09-28 ASSESSMENT — ENCOUNTER SYMPTOMS
WEIGHT LOSS: 0
DIAPHORESIS: 0
EYE PAIN: 0
COUGH: 0
RESPIRATORY NEGATIVE: 1
HEMOPTYSIS: 0
GASTROINTESTINAL NEGATIVE: 1
CONSTITUTIONAL NEGATIVE: 1
PALPITATIONS: 0
CARDIOVASCULAR NEGATIVE: 1
FEVER: 0

## 2018-09-28 NOTE — MR AVS SNAPSHOT
After Visit Summary   9/28/2018    Teto Stephens    MRN: [id number]           Patient Information     Date Of Birth          1975        Visit Information        Provider Department      9/28/2018 1:00 PM Cheryl De Lenó PA-C [facility]        Today's Diagnoses     Superficial bruising of arm, right, initial encounter    -  1    Thrombocytopenia (HCC)           Follow-ups after your visit        Additional Services     ONC/HEME ADULT REFERRAL       Your provider has referred you to:  Health: Cancer Care/Hematology (All Cancer Related Services) - [place] [phone]   [email or web address]  [place] [phone]   [email or web address] [facility]: [facility] - [place] [phone]   [email or web address] [facility]: [facility] Bleeding [facility] Clotting [facility] - [place] [phone]  [email or web address]    Please be aware that coverage of these services is subject to the terms and limitations of your health insurance plan.  Call member services at your health plan with any benefit or coverage questions.      Please bring the following with you to your appointment:    (1) Any X-Rays, CTs or MRIs which have been performed.  Contact the facility where they were done to arrange for  prior to your scheduled appointment.   (2) List of current medications  (3) This referral request   (4) Any documents/labs given to you for this referral                  Who to contact     If you have questions or need follow up information about today's clinic visit or your schedule please contact [facility] directly at [phone].  Normal or non-critical lab and imaging results will be communicated to you by MyChart, letter or phone within 4  business days after the clinic has received the results. If you do not hear from us within 7 days, please contact the clinic through [employer] or phone. If you have a critical or abnormal lab result, we will notify you by phone as soon as possible.  Submit refill requests through [employer] or call your pharmacy and they will forward the refill request to us. Please allow 3 business days for your refill to be completed.          Additional Information About Your Visit        Care EveryWhere ID     This is your Care EveryWhere ID. This could be used by other organizations to access your [place] medical records  [id number]        Your Vitals Were     Pulse Temperature Pulse Oximetry BMI (Body Mass Index)          78 97.6  F (36.4  C) (Tympanic) 100% 24.68 kg/m2         Blood Pressure from Last 3 Encounters:   08/01/18 150/80   05/09/17 117/77   03/18/16 130/80    Weight from Last 3 Encounters:   09/28/18 136 lb (61.7 kg)   08/01/18 131 lb (59.4 kg)   05/09/17 134 lb (60.8 kg)              We Performed the Following     CBC with platelets     Comprehensive metabolic panel     INR     ONC/HEME ADULT REFERRAL     TSH with free T4 reflex     Vitamin B12        Primary Care Provider Office Phone # Fax #    Carlitos Gtz [phone] [address] [facility] [address]        Equal Access to Services     DEMI MERCEDES : Hadii aad ku hadasho Soomaali, waaxda luqadaha, qaybta kaalmada adeegyada, jamey cantu . So [facility] [phone].    ATENCIÓN: Si habla español, tiene a may disposición servicios gratuitos de asistencia lingüística. Llame al [phone].    We comply with applicable federal civil rights laws and [place] laws. We do not discriminate on the basis of race, color, national origin, age, disability, sex, sexual orientation, or gender identity.            Thank you!     Thank you for choosing [facility]  for your care. Our goal is always to provide  you with excellent care. Hearing back from our patients is one way we can continue to improve our services. Please take a few minutes to complete the written survey that you may receive in the mail after your visit with us. Thank you!             Your Updated Medication List - Protect others around you: Learn how to safely use, store and throw away your medicines at www.disposemymeds.org.          This list is accurate as of 9/28/18  1:32 PM.  Always use your most recent med list.                   Brand Name Dispense Instructions for use Diagnosis    carBAMazepine 100 MG chewable tablet    TEGretol     100 mg 2 times daily 4 tablets twice daily.        cloNIDine 0.2 MG tablet    CATAPRES     Take 0.2 mg by mouth 3 times daily.        * DEPAKOTE 500 MG DR tablet   Generic drug:  divalproex sodium delayed-release      Take 500 mg by mouth 3 times daily. 625mg at noon        * divalproex sodium delayed-release 125 MG DR tablet    DEPAKOTE     125 mg Take 1 tablet once daily along with 500mg        LORazepam 1 MG tablet    ATIVAN     Take 1 mg by mouth 3 times daily.        risperDAL 0.25 MG tablet   Generic drug:  risperiDONE      Take 0.25 mg by mouth 2 times daily.        vitamin D 2000 units tablet     100 tablet    Take 2,000 Units by mouth daily    Vitamin D deficiency       * Notice:  This list has 2 medication(s) that are the same as other medications prescribed for you. Read the directions carefully, and ask your doctor or other care provider to review them with you.

## 2018-09-28 NOTE — PROGRESS NOTES
SUBJECTIVE:      HPI  Teto Stephens is a 43 year old male who presents to clinic today with foster Mom and guardian for the following health issues:  Bruising over the R upper arm    Duration: 2days ago.  Foster Mom and legal guardian noticed bruising over the R upper arm yesterday.  No known trauma or injuries.  He had a similar bruise a month ago when he had his BP checked with blood pressure cuff placed in this arm which resolved spontaneously.      Description (location/character/radiation): R upper arm    Intensity:  mild    Accompanying signs and symptoms: He does not appear to be in pain.  No radicular pain, numbness, tingling or weakness.  No swelling, redness, drainage or fevers.      History (similar episodes/previous evaluation): He does tend to lean on his right side and R arm when sitting and resting.  Also has known hx of thrombocytopenia which has been stable at 130-140s.  No blood thinners, ASA or NSAIDs.    Precipitating or alleviating factors: None    Therapies tried and outcome: None       Reviewed PMH, FMH and SOH.  Patient Active Problem List   Diagnosis     Autism     Development delay     CARDIOVASCULAR SCREENING; LDL GOAL LESS THAN 160     Impacted cerumen     Vitamin D deficiency     Dental abscess     Thrombocytopenia (HCC)     Hypothyroidism, unspecified type     Elevated blood pressure reading without diagnosis of hypertension     Current Outpatient Prescriptions   Medication Sig Dispense Refill     carBAMazepine (TEGRETOL) 100 MG chewable tablet 100 mg 2 times daily 4 tablets twice daily.  8     Cholecalciferol (VITAMIN D) 2000 UNITS tablet Take 2,000 Units by mouth daily 100 tablet 3     cloNIDINE (CATAPRES) 0.2 MG tablet Take 0.2 mg by mouth 3 times daily.       divalproex (DEPAKOTE) 125 MG EC tablet 125 mg Take 1 tablet once daily along with 500mg  11     divalproex (DEPAKOTE) 500 MG EC tablet Take 500 mg by mouth 3 times daily. 625mg at noon         lorazepam (ATIVAN) 1 MG  tablet Take 1 mg by mouth 3 times daily.       risperidone (RISPERDAL) 0.25 MG tablet Take 0.25 mg by mouth 2 times daily.       No Known Allergies    Review of Systems   Constitutional: Negative.  Negative for diaphoresis, fever and weight loss.   HENT: Negative.    Eyes: Negative for pain.   Respiratory: Negative.  Negative for cough and hemoptysis.    Cardiovascular: Negative.  Negative for chest pain and palpitations.   Gastrointestinal: Negative.    Skin: Negative.    All other systems reviewed and are negative.      Pulse 78  Temp 97.6  F (36.4  C) (Tympanic)  Wt 136 lb (61.7 kg)  SpO2 100%  BMI 24.68 kg/m2  Physical Exam   Constitutional: He is oriented to person, place, and time and well-developed, well-nourished, and in no distress. Vital signs are normal. No distress.   Non-communicative    Musculoskeletal:        Right shoulder: Normal.        Right elbow: Normal.       Right wrist: Normal.        Right upper arm: He exhibits no tenderness, no bony tenderness, no swelling, no edema, no deformity and no laceration.        Right forearm: Normal.        Arms:       Right hand: Normal.   Neurological: He is alert and oriented to person, place, and time. He has normal sensation, normal strength and normal reflexes. Gait normal.   Skin: Skin is warm, dry and intact. Bruising noted.   Distal pulses are 2+ and symmetric.  No peripheral edema.   Psychiatric: Mood, memory and judgment normal. He has a flat affect.   Vitals reviewed.        Assessment/Plan:  Superficial bruising of arm, right, initial encounter:  Unclear etiology.  ?secondary to his thrombocytopenia vs trauma/injury (no known trauma/injury) vs him leaning on his arm when lying down vs bleeding disorder.  Had similar bruising last time with blood pressure cuff.  Will check labs below and send to hematology for further evaluation and management.  Platelets have been stable in the 130-140s range.  RICE.  Tylenol/ibuprofen as needed for pain.   Recheck in clinic if symptoms worsen or if symptoms do not improve.  -     CBC with platelets  -     INR  -     TSH with free T4 reflex  -     Vitamin B12  -     Comprehensive metabolic panel    Thrombocytopenia (HCC)  -     ONC/HEME ADULT REFERRAL          Cheryl De León PA-C

## 2018-09-28 NOTE — PROGRESS NOTES
"  SUBJECTIVE:   Teto Stephens is a 43 year old male who presents to clinic today for the following health issues:      Patient has a bruise on right arm started from 8/1/18 BP cuff possibly. Bruise did go away, but noticed it was back on Wednesday night, no cause, had to report to state.    {additional problems for provider to add:720754}    Problem list and histories reviewed & adjusted, as indicated.  Additional history: {NONE - AS DOCUMENTED:194734::\"as documented\"}    {HIST REVIEW/ LINKS 2:424143}    Reviewed and updated as needed this visit by clinical staff       Reviewed and updated as needed this visit by Provider         {PROVIDER CHARTING PREFERENCE:042653}  "

## 2018-10-16 ENCOUNTER — TRANSFERRED RECORDS (OUTPATIENT)
Dept: HEALTH INFORMATION MANAGEMENT | Facility: CLINIC | Age: 43
End: 2018-10-16

## 2019-01-18 ENCOUNTER — TRANSFERRED RECORDS (OUTPATIENT)
Dept: HEALTH INFORMATION MANAGEMENT | Facility: CLINIC | Age: 44
End: 2019-01-18

## 2019-07-18 ENCOUNTER — TRANSFERRED RECORDS (OUTPATIENT)
Dept: HEALTH INFORMATION MANAGEMENT | Facility: CLINIC | Age: 44
End: 2019-07-18

## 2020-02-26 DIAGNOSIS — D33.2 BENIGN NEOPLASM OF BRAIN (H): ICD-10-CM

## 2020-02-26 DIAGNOSIS — G40.A19: ICD-10-CM

## 2020-02-26 DIAGNOSIS — E55.9 AVITAMINOSIS D: ICD-10-CM

## 2020-02-26 DIAGNOSIS — D33.2 BENIGN NEOPLASM OF BRAIN (H): Primary | ICD-10-CM

## 2020-02-26 LAB
ALBUMIN SERPL-MCNC: 3.7 G/DL (ref 3.4–5)
ALP SERPL-CCNC: 65 U/L (ref 40–150)
ALT SERPL W P-5'-P-CCNC: 18 U/L (ref 0–70)
ANION GAP SERPL CALCULATED.3IONS-SCNC: 5 MMOL/L (ref 3–14)
AST SERPL W P-5'-P-CCNC: 12 U/L (ref 0–45)
BILIRUB SERPL-MCNC: 0.2 MG/DL (ref 0.2–1.3)
BUN SERPL-MCNC: 12 MG/DL (ref 7–30)
CALCIUM SERPL-MCNC: 8.9 MG/DL (ref 8.5–10.1)
CARBAMAZEPINE SERPL-MCNC: 11.1 MG/L (ref 4–12)
CHLORIDE SERPL-SCNC: 104 MMOL/L (ref 94–109)
CO2 SERPL-SCNC: 31 MMOL/L (ref 20–32)
CREAT SERPL-MCNC: 0.82 MG/DL (ref 0.66–1.25)
ERYTHROCYTE [DISTWIDTH] IN BLOOD BY AUTOMATED COUNT: 11.6 % (ref 10–15)
GFR SERPL CREATININE-BSD FRML MDRD: >90 ML/MIN/{1.73_M2}
GLUCOSE SERPL-MCNC: 77 MG/DL (ref 70–99)
HCT VFR BLD AUTO: 38.5 % (ref 40–53)
HGB BLD-MCNC: 13.1 G/DL (ref 13.3–17.7)
MCH RBC QN AUTO: 32.3 PG (ref 26.5–33)
MCHC RBC AUTO-ENTMCNC: 34 G/DL (ref 31.5–36.5)
MCV RBC AUTO: 95 FL (ref 78–100)
PLATELET # BLD AUTO: 141 10E9/L (ref 150–450)
POTASSIUM SERPL-SCNC: 3.6 MMOL/L (ref 3.4–5.3)
PROT SERPL-MCNC: 7.7 G/DL (ref 6.8–8.8)
RBC # BLD AUTO: 4.06 10E12/L (ref 4.4–5.9)
SODIUM SERPL-SCNC: 140 MMOL/L (ref 133–144)
VALPROATE SERPL-MCNC: 85 MG/L (ref 50–100)
WBC # BLD AUTO: 3.9 10E9/L (ref 4–11)

## 2020-02-26 PROCEDURE — 80156 ASSAY CARBAMAZEPINE TOTAL: CPT | Mod: 90 | Performed by: PSYCHIATRY & NEUROLOGY

## 2020-02-26 PROCEDURE — 85027 COMPLETE CBC AUTOMATED: CPT | Performed by: PSYCHIATRY & NEUROLOGY

## 2020-02-26 PROCEDURE — 80164 ASSAY DIPROPYLACETIC ACD TOT: CPT | Performed by: PSYCHIATRY & NEUROLOGY

## 2020-02-26 PROCEDURE — 82306 VITAMIN D 25 HYDROXY: CPT | Performed by: PSYCHIATRY & NEUROLOGY

## 2020-02-26 PROCEDURE — 80157 ASSAY CARBAMAZEPINE FREE: CPT | Mod: 90 | Performed by: PSYCHIATRY & NEUROLOGY

## 2020-02-26 PROCEDURE — 80053 COMPREHEN METABOLIC PANEL: CPT | Performed by: PSYCHIATRY & NEUROLOGY

## 2020-02-26 PROCEDURE — 99000 SPECIMEN HANDLING OFFICE-LAB: CPT | Performed by: PSYCHIATRY & NEUROLOGY

## 2020-02-26 PROCEDURE — 36415 COLL VENOUS BLD VENIPUNCTURE: CPT | Performed by: PSYCHIATRY & NEUROLOGY

## 2020-02-26 PROCEDURE — 80156 ASSAY CARBAMAZEPINE TOTAL: CPT | Performed by: PSYCHIATRY & NEUROLOGY

## 2020-02-28 LAB
DEPRECATED CALCIDIOL+CALCIFEROL SERPL-MC: <76 UG/L (ref 20–75)
VITAMIN D2 SERPL-MCNC: 71 UG/L
VITAMIN D3 SERPL-MCNC: <5 UG/L

## 2020-02-29 LAB
CARBAMAZEPINE EP FREE SERPL-MCNC: 1.7 UG/ML (ref 0.2–2)
CARBAMAZEPINE EP SERPL-MCNC: 3.9 UG/ML (ref 0.4–4)
CARBAMAZEPINE FREE SERPL-MCNC: 2.1 UG/ML (ref 0.6–4.2)
CARBAMAZEPINE SERPL-MCNC: 9.6 UG/ML (ref 4–12)

## 2020-06-04 ENCOUNTER — TELEPHONE (OUTPATIENT)
Dept: FAMILY MEDICINE | Facility: CLINIC | Age: 45
End: 2020-06-04

## 2020-06-04 NOTE — TELEPHONE ENCOUNTER
Reason for Call:  Other weight loss    Detailed comments: Bridgett falcon mom for patient is getting concerned about the weight loss of patient. Bridgett stated last year pt was seen at [facility] she will get the records from them to pcp     Phone Number Patient can be reached at: Home number on file [phone] (home)    Best Time: any    Can we leave a detailed message on this number? YES    Call taken on 6/4/2020 at 9:10 AM by Esther Gottlieb

## 2020-06-04 NOTE — TELEPHONE ENCOUNTER
Foster mom is concerned that Tram keeps loosing weight. Last November (2019) his weight at [facility] was 123 lbs and now it is 105. He does NOT have excessive thirst, urination nor is he limp, lethargic or unresponsive - per foster mom. Blood sugars have been good - per foster mom, but they are not checking them - she is stating this from the lab results she has. Foster mom states that he was diagnosed in 2010 with diabetes insipidus (foster mom keeps using this term) from [place], but we do not have this on our problem list.  He has not had any diabetes care - per foster mom.  She cannot relay blood sugars to me as they do not test his blood sugar and she is a poor historian in regards to he care. I cannot see records from [facility] Everywhere as an authorization is required. He was seen on 11/20/2019 as transfer patient FROM [place] at [facility] for a Physical.    Nursing advice:  Patient to be seen. Osorio juan was offered an appointment today with Dr. Booker and is not able to make this and would like to wait for Dr. Gtz.  Osorio juan assisted in making the appointment as listed below she was given the address and time to write down for this appointment.  If he becomes limp, lethargic, no responsive she is to call 911.  If he has frequent urination or excessive thirst she is to bring him to the E.R.  Mom was asked to arrive 15 minutes early for check in, bring his medication with them and any previous records they may have. The COVID19 screen was negative at this time.  They were informed that will have to mask when they get here and please to have only 1 person accompany him.  She was asked to bring the paperwork that shows she is foster mom to him so we can scan it in. Foster mom verbalizes good understanding, agrees with plan and states she needs no further support. Shyanne Wagner R.N.        Last seen in [place] system 8/1/2018 for physical.    Wt Readings from Last 5 Encounters:    09/28/18 61.7 kg (136 lb)   08/01/18 59.4 kg (131 lb)   05/09/17 60.8 kg (134 lb)   03/18/16 60.3 kg (133 lb)   08/14/15 60.3 kg (133 lb)       Next 5 appointments (look out 90 days)    Daniel 10, 2020  2:20 PM CDT  Office Visit with [facility] Gtz [facility] [id number] [address] [facility] [address] [phone]        To provider as MARIAMA.  Thank you. Shyanne Wagner R.N.

## 2020-06-10 ENCOUNTER — OFFICE VISIT (OUTPATIENT)
Dept: FAMILY MEDICINE | Facility: CLINIC | Age: 45
End: 2020-06-10
Payer: MEDICAID

## 2020-06-10 VITALS
DIASTOLIC BLOOD PRESSURE: 60 MMHG | HEART RATE: 97 BPM | BODY MASS INDEX: 22.15 KG/M2 | HEIGHT: 63 IN | SYSTOLIC BLOOD PRESSURE: 120 MMHG | TEMPERATURE: 98 F | OXYGEN SATURATION: 99 % | WEIGHT: 125 LBS

## 2020-06-10 DIAGNOSIS — R63.4 WEIGHT LOSS: Primary | ICD-10-CM

## 2020-06-10 LAB
ALBUMIN SERPL-MCNC: 3.3 G/DL (ref 3.4–5)
ALP SERPL-CCNC: 53 U/L (ref 40–150)
ALT SERPL W P-5'-P-CCNC: 25 U/L (ref 0–70)
ANION GAP SERPL CALCULATED.3IONS-SCNC: 4 MMOL/L (ref 3–14)
AST SERPL W P-5'-P-CCNC: 20 U/L (ref 0–45)
BASOPHILS # BLD AUTO: 0 10E9/L (ref 0–0.2)
BASOPHILS NFR BLD AUTO: 0.2 %
BILIRUB SERPL-MCNC: 0.1 MG/DL (ref 0.2–1.3)
BUN SERPL-MCNC: 23 MG/DL (ref 7–30)
CALCIUM SERPL-MCNC: 8.6 MG/DL (ref 8.5–10.1)
CHLORIDE SERPL-SCNC: 108 MMOL/L (ref 94–109)
CO2 SERPL-SCNC: 29 MMOL/L (ref 20–32)
CREAT SERPL-MCNC: 0.85 MG/DL (ref 0.66–1.25)
DIFFERENTIAL METHOD BLD: ABNORMAL
EOSINOPHIL # BLD AUTO: 0.1 10E9/L (ref 0–0.7)
EOSINOPHIL NFR BLD AUTO: 1.9 %
ERYTHROCYTE [DISTWIDTH] IN BLOOD BY AUTOMATED COUNT: 12.9 % (ref 10–15)
ERYTHROCYTE [SEDIMENTATION RATE] IN BLOOD BY WESTERGREN METHOD: 6 MM/H (ref 0–15)
GFR SERPL CREATININE-BSD FRML MDRD: >90 ML/MIN/{1.73_M2}
GLUCOSE SERPL-MCNC: 112 MG/DL (ref 70–99)
HCT VFR BLD AUTO: 36.4 % (ref 40–53)
HGB BLD-MCNC: 12 G/DL (ref 13.3–17.7)
LYMPHOCYTES # BLD AUTO: 1.4 10E9/L (ref 0.8–5.3)
LYMPHOCYTES NFR BLD AUTO: 30.4 %
MCH RBC QN AUTO: 32.2 PG (ref 26.5–33)
MCHC RBC AUTO-ENTMCNC: 33 G/DL (ref 31.5–36.5)
MCV RBC AUTO: 98 FL (ref 78–100)
MONOCYTES # BLD AUTO: 0.5 10E9/L (ref 0–1.3)
MONOCYTES NFR BLD AUTO: 10 %
NEUTROPHILS # BLD AUTO: 2.7 10E9/L (ref 1.6–8.3)
NEUTROPHILS NFR BLD AUTO: 57.5 %
PLATELET # BLD AUTO: 130 10E9/L (ref 150–450)
POTASSIUM SERPL-SCNC: 4.2 MMOL/L (ref 3.4–5.3)
PROT SERPL-MCNC: 6.9 G/DL (ref 6.8–8.8)
RBC # BLD AUTO: 3.73 10E12/L (ref 4.4–5.9)
SODIUM SERPL-SCNC: 141 MMOL/L (ref 133–144)
TSH SERPL DL<=0.005 MIU/L-ACNC: 2.44 MU/L (ref 0.4–4)
WBC # BLD AUTO: 4.7 10E9/L (ref 4–11)

## 2020-06-10 PROCEDURE — 80050 GENERAL HEALTH PANEL: CPT | Performed by: FAMILY MEDICINE

## 2020-06-10 PROCEDURE — 36415 COLL VENOUS BLD VENIPUNCTURE: CPT | Performed by: FAMILY MEDICINE

## 2020-06-10 PROCEDURE — 99214 OFFICE O/P EST MOD 30 MIN: CPT | Performed by: FAMILY MEDICINE

## 2020-06-10 PROCEDURE — 85652 RBC SED RATE AUTOMATED: CPT | Performed by: FAMILY MEDICINE

## 2020-06-10 ASSESSMENT — MIFFLIN-ST. JEOR: SCORE: 1347.13

## 2020-06-10 NOTE — PROGRESS NOTES
HPI:    Teto is a 45 year old male with history of autism, MR, non-verbal, male here for:    Here with foster mom (his brother's wife). But he has adoptive mom who is involved in his care daily.      Please note: only the issues listed at the bottom under Assessment and Plan are addressed today. The rest of the medical problems are listed for the sake of completeness.      Weight loss - First noticed: a few months ago. Per our records, the weight loss has been as below. The weight loss was not intentional. Appetite/caloric intake has been unchanged. The patient has not had symptoms of depression or stress. No medications that would cause weight loss like stimulants. The patient has intestinal issues like abd pain, nausea, vomiting, diarrhea, blood in stool or black stools. No infectious symptoms like fevers, chills or night sweats. No neoplastic symptoms like lymph node enlargement. No skin or mucous membrane ulcers. No inflammatory symptoms like muscle pain, rashes, joint or bone pain. No chemical use like alcohol, tobacco, drugs.  Evaluation and treatment:    I discussed the diff dx of unintentional weight loss which can include reduced caloric intake(due to depression, stress, anorexia nervosa, dementia, abdominal angina - SMA stenosis) medications, malabsorption, infectious, neoplastic, inflammatory, chemical use.   At this time, the etiology is not clear but most likely appears to be reduced caloric intake.    We discussed options for evaluation and treatment.    We will start with CMP, CBC, ESR, TSH and go from there.    I asked to see him for a physical in 2 months - we can weigh him again at that time.    Wt Readings from Last 5 Encounters:   06/10/20 56.7 kg (125 lb)   09/28/18 61.7 kg (136 lb)   08/01/18 59.4 kg (131 lb)   05/09/17 60.8 kg (134 lb)   03/18/16 60.3 kg (133 lb)     Autism, MR - non verbal. Needs full cares except he dresses himself. Generally cooperative, but sometimes can be  challenging if he is agitated. Has certain behaviors like repetitive motions. Follows with psychiatry is on Ativan, Clonidine and Risperdal.    Dental infection - he had tooth extraction under anesthesia on 3/31/16.     Seizure d/o - follows with neurology. On Tegretol and Depakote.    H/O Diabetes Insipidus - previously on Lithium. Now resolved.    Ear wax - irrigated successfully previously. Counseled on using Debrox or Mineral oil to prevent recurrence.     Low Vit D - this was low on 5/12/14. Advised 2000 units daily.    Thrombocytopenia - this has been mild. Likely ITP. No issues with easy bruising or bleeding. No need for further investigation except follow periodically.    CBC RESULTS:   Recent Labs   Lab Test  03/18/16   1224   WBC  5.3   RBC  4.27*   HGB  14.0   HCT  40.8   MCV  96   MCH  32.8   MCHC  34.3   RDW  11.7   PLT  142*       CBC RESULTS:   Recent Labs    Lab Test  05/12/14  0825    WBC  4.2    RBC  4.29*    HGB  13.8    HCT  41.5    MCV  97    MCH  32.2    MCHC  33.3    RDW  12.3    PLT  134*        Preventive:    We don't have vaccine records yet but his foster mom says his vaccines are up to date. She will forward this from home.    Lipids:   Recent Labs    Lab Test  05/12/14  0825  05/18/13  1030    CHOL  162  179    HDL  54  53    LDL  93  104    TRIG  70  108    CHOLHDLRATIO  3.0  3.4      Diabetes test:     Last Basic Metabolic Panel:  NA      143   5/13/2015   POTASSIUM      4.0   5/13/2015  CHLORIDE      107   5/13/2015  CLAUDIO      9.4   5/13/2015  CO2       27   5/13/2015  BUN       12   5/13/2015  CR     0.82   5/13/2015  GLC       95   5/13/2015    Lipids screen:    Recent Labs   Lab Test  05/12/14   0825  05/18/13   1030   CHOL  162  179   HDL  54  53   LDL  93  104   TRIG  70  108   CHOLHDLRATIO  3.0  3.4       Advanced Directive: discussed but declined    Today's PHQ-2 Score:   PHQ-2 ( 1999 Pfizer) 8/1/2018 5/9/2017   Q1: Little interest or pleasure in doing things 0 0   Q2: Feeling  "down, depressed or hopeless 0 0   PHQ-2 Score 0 0   Q1: Little interest or pleasure in doing things Not at all -   Q2: Feeling down, depressed or hopeless Not at all -   PHQ-2 Score 0 -       Abuse: Current or Past(Physical, Sexual or Emotional)- No  Do you feel safe in your environment - Yes    Social History     Tobacco Use     Smoking status: Never Smoker     Smokeless tobacco: Never Used   Substance Use Topics     Alcohol use: No     The patient does not drink >3 drinks per day nor >7 drinks per week.    Last PSA: No results found for: PSA    Recent Labs   Lab Test  05/12/14   0825  05/18/13   1030   CHOL  162  179   HDL  54  53   LDL  93  104   TRIG  70  108   CHOLHDLRATIO  3.0  3.4       Reviewed orders with patient. Reviewed health maintenance and updated orders accordingly - Yes    Reviewed and updated as needed this visit by clinical staff  Tobacco  Allergies  Meds  Med Hx  Surg Hx  Fam Hx  Soc Hx        Reviewed and updated as needed this visit by Provider            ROS:  C: NEGATIVE for fever, chills  I: NEGATIVE for worrisome rashes, moles or lesions  E: NEGATIVE for vision changes or irritation  ENT: NEGATIVE for ear, mouth and throat problems  R: NEGATIVE for significant cough or SOB  CV: NEGATIVE for chest pain, palpitations or peripheral edema  GI: NEGATIVE for nausea, abdominal pain, heartburn, or change in bowel habits   male: negative for dysuria, hematuria, decreased urinary stream, erectile dysfunction, urethral discharge  M: NEGATIVE for significant arthralgias or myalgia  N: NEGATIVE for weakness, dizziness or paresthesias  P: NEGATIVE for changes in mood or affect      OBJECTIVE:     Pulse 97   Temp 98  F (36.7  C) (Tympanic)   Ht 1.6 m (5' 3\")   Wt 56.7 kg (125 lb)   SpO2 99%   BMI 22.14 kg/m    EXAM:  GENERAL APPEARANCE: autistic patient, non verbal, with typical mannerism, cooperative in no distress. Here with his foster mom and brother - they are good about calming him down. "   EYES: Eyes grossly normal to inspection, PERRL and conjunctivae and sclerae normal. There is baseline strabismus.  HENT:  mouth without ulcers or lesions  NECK: no adenopathy, no asymmetry, masses, or scars and thyroid normal to palpation  RESP: lungs clear to auscultation - no rales, rhonchi or wheezes  CV: regular rate and rhythm, normal S1 S2, no S3 or S4, no murmur, click or rub, no peripheral edema and peripheral pulses strong  ABDOMEN: soft, nontender, no hepatosplenomegaly, no masses and bowel sounds normal  MS: no gross musculoskeletal defects noted, no edema  SKIN: no suspicious lesions or rashes  NEURO: autistic patient, non verbal, with typical mannerism, cooperative in no distress.    Assessment and Plan - Decision Making    1. Weight loss    Per HPI    - Comprehensive metabolic panel (BMP + Alb, Alk Phos, ALT, AST, Total. Bili, TP)  - CBC with platelets and differential  - TSH with free T4 reflex  - ESR: Erythrocyte sedimentation rate      Written instructions given as follows:    Patient Instructions   1. I will contact you with results.    2. Let me see him in 2 months with fasting labs.

## 2020-06-10 NOTE — PATIENT INSTRUCTIONS
1. I will contact you with results.    2. Let me see him in 2 months with fasting labs.   Response requested.    Drug: Ranitidine    Pharmacy notes: Patient requests new rx: Ranitidine has been pulled off the market, could you please send alternative or advise?

## 2020-06-11 ENCOUNTER — TELEPHONE (OUTPATIENT)
Dept: FAMILY MEDICINE | Facility: CLINIC | Age: 45
End: 2020-06-11

## 2020-06-11 DIAGNOSIS — D64.9 ANEMIA, UNSPECIFIED TYPE: Primary | ICD-10-CM

## 2020-06-11 NOTE — TELEPHONE ENCOUNTER
Attempted to reach Bridgett, , regarding pt message below. There was no answer. LM to return call to RN at [phone].  Melany Matthews, GARCIAN, RN

## 2020-06-11 NOTE — TELEPHONE ENCOUNTER
Please call foster mom.     1. Low hemoglobin - I would like to do a stool test to make sure he is not bleeding internally.     2. Low albumin - he needs more protein in his diet in the form of beans, tofu, lean meats like fish, turnkey, chicken.    I will contact you again when I get the stool test result.    Carlitos Gtz M.D.

## 2020-06-11 NOTE — RESULT ENCOUNTER NOTE
See telephone encounter - I will ask for stool occult blood test to evaluate the anemia.    Low albumin noted - I will ask to increase dietary protein.    Carlitos Gtz M.D.

## 2020-06-12 NOTE — TELEPHONE ENCOUNTER
Bridgett notified of provider message as written. Lab will have test and instructions with a hat for collection at  for her to .  Bridgett notified and will pick this up.      Bridgett asking for protein ideas pt does not have teeth.  Advised greek yogurt, soy milk, cottage cheese, beans, and peanut butter.   Nancy ZELAYAN, RN

## 2020-07-08 ENCOUNTER — TELEPHONE (OUTPATIENT)
Dept: FAMILY MEDICINE | Facility: CLINIC | Age: 45
End: 2020-07-08

## 2020-07-08 DIAGNOSIS — D64.9 ANEMIA, UNSPECIFIED TYPE: Primary | ICD-10-CM

## 2020-07-08 NOTE — TELEPHONE ENCOUNTER
"Reason for Call:  Other call back    Detailed comments: foster mom is calling patient/ AKA \"BB\" is refusing to do stool sample, is there another route to check hemoglobin. Please call to discuss. Thank you.    Phone Number Patient can be reached at: [phone]    Best Time:     Can we leave a detailed message on this number? YES    Call taken on 7/8/2020 at 11:31 AM by Mitzi Mcghee      "

## 2020-07-13 ENCOUNTER — AMBULATORY - HEALTHEAST (OUTPATIENT)
Dept: OTHER | Facility: CLINIC | Age: 45
End: 2020-07-13

## 2020-07-13 ENCOUNTER — DOCUMENTATION ONLY (OUTPATIENT)
Dept: OTHER | Facility: CLINIC | Age: 45
End: 2020-07-13

## 2020-07-13 NOTE — TELEPHONE ENCOUNTER
Let's repeat the CBC along with iron, ferritin, folate and B12.    I will be in touch once I get those results.    Carlitos Gtz M.D.

## 2020-07-13 NOTE — TELEPHONE ENCOUNTER
Called and spoke to Bridgett, I informed her of Dr Gtz's message, lab appointment made.Elida Birmingham MA/PETER

## 2020-07-24 DIAGNOSIS — D64.9 ANEMIA, UNSPECIFIED TYPE: ICD-10-CM

## 2020-07-24 LAB
BASOPHILS # BLD AUTO: 0 10E9/L (ref 0–0.2)
BASOPHILS NFR BLD AUTO: 0.5 %
DIFFERENTIAL METHOD BLD: NORMAL
EOSINOPHIL # BLD AUTO: 0.1 10E9/L (ref 0–0.7)
EOSINOPHIL NFR BLD AUTO: 1.3 %
ERYTHROCYTE [DISTWIDTH] IN BLOOD BY AUTOMATED COUNT: 11.7 % (ref 10–15)
FERRITIN SERPL-MCNC: 79 NG/ML (ref 26–388)
FOLATE SERPL-MCNC: 18.2 NG/ML
HCT VFR BLD AUTO: 42 % (ref 40–53)
HGB BLD-MCNC: 14.2 G/DL (ref 13.3–17.7)
IRON SATN MFR SERPL: 48 % (ref 15–46)
IRON SERPL-MCNC: 152 UG/DL (ref 35–180)
LYMPHOCYTES # BLD AUTO: 1.5 10E9/L (ref 0.8–5.3)
LYMPHOCYTES NFR BLD AUTO: 36.9 %
MCH RBC QN AUTO: 32.1 PG (ref 26.5–33)
MCHC RBC AUTO-ENTMCNC: 33.8 G/DL (ref 31.5–36.5)
MCV RBC AUTO: 95 FL (ref 78–100)
MONOCYTES # BLD AUTO: 0.4 10E9/L (ref 0–1.3)
MONOCYTES NFR BLD AUTO: 10.1 %
NEUTROPHILS # BLD AUTO: 2 10E9/L (ref 1.6–8.3)
NEUTROPHILS NFR BLD AUTO: 51.2 %
PLATELET # BLD AUTO: 160 10E9/L (ref 150–450)
RBC # BLD AUTO: 4.43 10E12/L (ref 4.4–5.9)
TIBC SERPL-MCNC: 315 UG/DL (ref 240–430)
VIT B12 SERPL-MCNC: 722 PG/ML (ref 193–986)
WBC # BLD AUTO: 4 10E9/L (ref 4–11)

## 2020-07-24 PROCEDURE — 82728 ASSAY OF FERRITIN: CPT | Performed by: FAMILY MEDICINE

## 2020-07-24 PROCEDURE — 83540 ASSAY OF IRON: CPT | Performed by: FAMILY MEDICINE

## 2020-07-24 PROCEDURE — 36415 COLL VENOUS BLD VENIPUNCTURE: CPT | Performed by: FAMILY MEDICINE

## 2020-07-24 PROCEDURE — 82607 VITAMIN B-12: CPT | Performed by: FAMILY MEDICINE

## 2020-07-24 PROCEDURE — 82746 ASSAY OF FOLIC ACID SERUM: CPT | Performed by: FAMILY MEDICINE

## 2020-07-24 PROCEDURE — 83550 IRON BINDING TEST: CPT | Performed by: FAMILY MEDICINE

## 2020-07-24 PROCEDURE — 85025 COMPLETE CBC W/AUTO DIFF WBC: CPT | Performed by: FAMILY MEDICINE

## 2020-10-29 ENCOUNTER — VIRTUAL VISIT (OUTPATIENT)
Dept: FAMILY MEDICINE | Facility: OTHER | Age: 45
End: 2020-10-29

## 2020-10-29 ENCOUNTER — NURSE TRIAGE (OUTPATIENT)
Dept: NURSING | Facility: CLINIC | Age: 45
End: 2020-10-29

## 2020-10-29 NOTE — PROGRESS NOTES
"Date: 10/29/2020 16:33:37  Clinician: Khari Nazario  Clinician NPI: [id number]  Patient: Teto Stephens  Patient : 1975  Patient Address: [address] [place] [id number]  Patient Phone: [phone]  Visit Protocol: URI  Patient Summary:  Teto is a 45 year old ( : 1975 ) male who initiated a [facility] Visit for COVID-19 (Coronavirus) evaluation and screening. When asked the question \"Please sign me up to receive news, health information and promotions. \", Teto responded \"No\".    When asked when his symptoms started, Teto reported that he does not have any symptoms.   He denies having recent facial or sinus surgery in the past 60 days and taking antibiotic medication in the past month.    Pertinent COVID-19 (Coronavirus) information  Teto does not work or volunteer as healthcare worker or a . In the past 14 days, Teto has not worked or volunteered at a healthcare facility or group living setting.   In the past 14 days, he also has not lived in a congregate living setting.   Teto has had a close contact with a laboratory-confirmed COVID-19 patient in the last 14 days. He was exposed at his work. 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 Followed 9 ft social distancing protocols .   Teto is not living in the same household with the COVID-19 positive patient. He was in an enclosed space for greater than 15 minutes with the COVID-19 patient.   During the encounter, both of them were wearing masks.   Since 2019, Teto has not been diagnosed with lab-confirmed COVID-19 test and has not had upper respiratory infection or influenza-like illness.   Pertinent medical history  Teto " does not need a return to work/school note.   Weight: 120 lbs   Teto does not smoke or use smokeless tobacco.   Weight: 120 lbs    MEDICATIONS: clonidine HCl oral, risperidone oral, Depakote oral, lorazepam oral, carbamazepine oral, ALLERGIES: NKDA  Clinician Response:  Dear Teto,   Based on your exposure to COVID-19 (coronavirus), we would like to test you for this virus.  1. Please call [phone] to schedule your visit. Explain that you were referred by [facility] to have a COVID-19 test. Be [facility].   The following will serve as your written order for this COVID Test, ordered by me, for the indication of suspected COVID [Z20.828]: The test will be ordered in [employer], our electronic health record, after you are scheduled. It will show as ordered and authorized by Roni Ulloa MD.  Order: COVID-19 (coronavirus) PCR for ASYMPTOMATIC EXPOSURE testing from [facility].   If you know you have had close contact with someone who tested positive, you should be quarantined for 14 days after this exposure. You should stay in quarantine for the14 days even if the covid test is negative, the optimal time to test after exposure is 5-7 days from the exposure  Quarantine means   What should I do?  For safety, it's very important to follow these rules. Do this for 14 days after the date you were last exposed to the virus..  Stay home and away from others. Don't go to school or anywhere else. Generally quarantine means staying home from work but there are some exceptions to this. Please contact your workplace.   No hugging, kissing or shaking hands.  Don't let anyone visit.  Cover your mouth and nose with a mask, tissue or washcloth to avoid spreading germs.  Wash your hands and face often. Use soap and water.  What are the symptoms of COVID-19?  The most common symptoms are cough, fever and trouble breathing. Less common symptoms include headache, body aches, fatigue (feeling very tired),  chills, sore throat, stuffy or runny nose, diarrhea (loose poop), loss of taste or smell, belly pain, and nausea or vomiting (feeling sick to your stomach or throwing up).  After 14 days, if you have still don't have symptoms, you likely don't have this virus.  If you develop symptoms, follow these guidelines.  If you're normally healthy: Please start another [facility] visit to report your symptoms. Go to [email or web address].  If you have a serious health problem (like cancer, heart failure, an organ transplant or kidney disease): Call your specialty clinic. Let them know that you might have COVID-19.  2. When it's time for your COVID test:  Stay at least 6 feet away from others. (If someone will drive you to your test, stay in the backseat, as far away from the  as you can.)  Cover your mouth and nose with a mask, tissue or washcloth.  Go straight to the testing site. Don't make any stops on the way there or back.  Please note  Caregivers in these groups are at risk for severe illness due to COVID-19:  o People 65 years and older  o People who live in a nursing home or long-term care facility  o People with chronic disease (lung, heart, cancer, diabetes, kidney, liver, immunologic)  o People who have a weakened immune system, including those who:  Are in cancer treatment  Take medicine that weakens the immune system, such as corticosteroids  Had a bone marrow or organ transplant  Have an immune deficiency  Have poorly controlled HIV or AIDS  Are obese (body mass index of 40 or higher)  Smoke regularly  Where can I get more information?   [facility] -- About COVID-19: [email or web address]  CDC -- What to Do If You're Sick: www.cdc.gov/coronavirus/2019-ncov/about/steps-when-sick.html  CDC -- Ending Home Isolation: www.cdc.gov/coronavirus/2019-ncov/hcp/disposition-in-home-patients.html  CDC -- Caring for Someone: www.cdc.gov/coronavirus/2019-ncov/if-you-are-sick/care-for-someone.html  [facility] -- Interim  Guidance for Hospital Discharge to Home: www.health.[facility].mn.us/diseases/coronavirus/hcp/hospdischarge.pdf  [facility] clinical trials (COVID-19 research studies): clinicalaffairs.[facility]/umn-clinical-trials  Below are the COVID-19 hotlines at the [facility] of Health ([facility]). Interpreters are available.  For health questions: Call 140-267-7669 or 1-296.997.1555 (7 a.m. to 7 p.m.)  For questions about schools and childcare: Call 303-986-2652 or 1-814.864.1312 (7 a.m. to 7 p.m.)    Diagnosis: Contact with and (suspected) exposure to other viral communicable diseases  Diagnosis ICD: Z20.828

## 2021-02-15 ENCOUNTER — TRANSFERRED RECORDS (OUTPATIENT)
Dept: HEALTH INFORMATION MANAGEMENT | Facility: CLINIC | Age: 46
End: 2021-02-15

## 2021-02-16 DIAGNOSIS — G40.419 TONIC SEIZURES, INTRACTABLE (H): ICD-10-CM

## 2021-02-16 DIAGNOSIS — D33.2: Primary | ICD-10-CM

## 2021-03-27 DIAGNOSIS — G40.419 TONIC SEIZURES, INTRACTABLE (H): ICD-10-CM

## 2021-03-27 DIAGNOSIS — D33.2: ICD-10-CM

## 2021-03-27 LAB
PROLACTIN SERPL-MCNC: 18 UG/L (ref 2–18)
VALPROATE SERPL-MCNC: 83 MG/L (ref 50–100)

## 2021-03-27 PROCEDURE — 99000 SPECIMEN HANDLING OFFICE-LAB: CPT | Performed by: PSYCHIATRY & NEUROLOGY

## 2021-03-27 PROCEDURE — 84443 ASSAY THYROID STIM HORMONE: CPT | Performed by: PSYCHIATRY & NEUROLOGY

## 2021-03-27 PROCEDURE — 36415 COLL VENOUS BLD VENIPUNCTURE: CPT | Performed by: PSYCHIATRY & NEUROLOGY

## 2021-03-27 PROCEDURE — 80157 ASSAY CARBAMAZEPINE FREE: CPT | Mod: 90 | Performed by: PSYCHIATRY & NEUROLOGY

## 2021-03-27 PROCEDURE — 84146 ASSAY OF PROLACTIN: CPT | Performed by: PSYCHIATRY & NEUROLOGY

## 2021-03-27 PROCEDURE — 80164 ASSAY DIPROPYLACETIC ACD TOT: CPT | Performed by: PSYCHIATRY & NEUROLOGY

## 2021-03-27 PROCEDURE — 80165 DIPROPYLACETIC ACID FREE: CPT | Mod: 90 | Performed by: PSYCHIATRY & NEUROLOGY

## 2021-03-27 PROCEDURE — 80161 ASY CARBAMAZEPIN 10,11-EPXID: CPT | Mod: 90 | Performed by: PSYCHIATRY & NEUROLOGY

## 2021-03-27 PROCEDURE — 80053 COMPREHEN METABOLIC PANEL: CPT | Performed by: PSYCHIATRY & NEUROLOGY

## 2021-03-27 PROCEDURE — 82306 VITAMIN D 25 HYDROXY: CPT | Performed by: PSYCHIATRY & NEUROLOGY

## 2021-03-28 LAB — DEPRECATED CALCIDIOL+CALCIFEROL SERPL-MC: 25 UG/L (ref 20–75)

## 2021-03-29 LAB
ALBUMIN SERPL-MCNC: 3.8 G/DL (ref 3.4–5)
ALP SERPL-CCNC: 59 U/L (ref 40–150)
ALT SERPL W P-5'-P-CCNC: 18 U/L (ref 0–70)
ANION GAP SERPL CALCULATED.3IONS-SCNC: 4 MMOL/L (ref 3–14)
AST SERPL W P-5'-P-CCNC: 15 U/L (ref 0–45)
BILIRUB SERPL-MCNC: 0.5 MG/DL (ref 0.2–1.3)
BUN SERPL-MCNC: 13 MG/DL (ref 7–30)
CALCIUM SERPL-MCNC: 9.4 MG/DL (ref 8.5–10.1)
CHLORIDE SERPL-SCNC: 106 MMOL/L (ref 94–109)
CO2 SERPL-SCNC: 30 MMOL/L (ref 20–32)
CREAT SERPL-MCNC: 0.85 MG/DL (ref 0.66–1.25)
GFR SERPL CREATININE-BSD FRML MDRD: >90 ML/MIN/{1.73_M2}
GLUCOSE SERPL-MCNC: 73 MG/DL (ref 70–99)
POTASSIUM SERPL-SCNC: 3.4 MMOL/L (ref 3.4–5.3)
PROT SERPL-MCNC: 7.8 G/DL (ref 6.8–8.8)
SODIUM SERPL-SCNC: 140 MMOL/L (ref 133–144)
TSH SERPL DL<=0.005 MIU/L-ACNC: 2.19 MU/L (ref 0.4–4)

## 2021-03-30 ENCOUNTER — VIRTUAL VISIT (OUTPATIENT)
Dept: FAMILY MEDICINE | Facility: CLINIC | Age: 46
End: 2021-03-30
Payer: MEDICAID

## 2021-03-30 DIAGNOSIS — R63.4 WEIGHT LOSS: Primary | ICD-10-CM

## 2021-03-30 LAB
CARBAMAZEPINE EP FREE SERPL-MCNC: 0.5 UG/ML (ref 0.2–2)
CARBAMAZEPINE EP SERPL-MCNC: 1 UG/ML (ref 0.4–4)
CARBAMAZEPINE FREE SERPL-MCNC: 1.5 UG/ML (ref 0.6–4.2)
CARBAMAZEPINE SERPL-MCNC: 6 UG/ML (ref 4–12)
VALPROATE FREE SERPL-MCNC: 5.9 UG/ML (ref 6–20)

## 2021-03-30 PROCEDURE — 99441 PR PHYSICIAN TELEPHONE EVALUATION 5-10 MIN: CPT | Performed by: FAMILY MEDICINE

## 2021-03-30 NOTE — PROGRESS NOTES
HPI:    Teto is a 45 year old male with history of autism, MR, non-verbal, male on the phone to discuss:    His foster mom, Bridgett (his brother's wife) is mostly in charge of him. But he has adoptive mom who is involved in his care as well.    Please note: only the issues listed at the bottom under Assessment and Plan are addressed today. The rest of the medical problems are listed for the sake of completeness.    Weight loss - First noticed: a few months ago. Per our records, the weight loss has been as below. The weight loss was not intentional. Appetite/caloric intake has been unchanged. The patient has not had symptoms of depression or stress. No medications that would cause weight loss like stimulants. The patient has intestinal issues like abd pain, nausea, vomiting, diarrhea, blood in stool or black stools. No infectious symptoms like fevers, chills or night sweats. No neoplastic symptoms like lymph node enlargement. No skin or mucous membrane ulcers. No inflammatory symptoms like muscle pain, rashes, joint or bone pain. No chemical use like alcohol, tobacco, drugs.  Evaluation and treatment:    I discussed the diff dx of unintentional weight loss which can include reduced caloric intake(due to depression, stress, anorexia nervosa, dementia, abdominal angina - SMA stenosis) medications, malabsorption, infectious, neoplastic, inflammatory, chemical use.   At this time, the etiology is not clear but most likely appears to be reduced caloric intake.    We discussed options for evaluation and treatment.    We will start with CMP, CBC, ESR, TSH and go from there.    I asked to see him for a physical - we can weigh him again at that time and update any labs.    Bridgett reports his home weight to be 116-118 #.    Wt Readings from Last 5 Encounters:   06/10/20 56.7 kg (125 lb)   09/28/18 61.7 kg (136 lb)   08/01/18 59.4 kg (131 lb)   05/09/17 60.8 kg (134 lb)   03/18/16 60.3 kg (133 lb)     Autism, MR - non  verbal. Needs full cares except he dresses himself. Generally cooperative, but sometimes can be challenging if he is agitated. Has certain behaviors like repetitive motions. Follows with psychiatry is on Ativan, Clonidine and Risperdal.    Dental infection - he had tooth extraction under anesthesia on 3/31/16.     Seizure d/o - follows with neurology. On Tegretol and Depakote.    H/O Diabetes Insipidus - previously on Lithium. Now resolved.    Ear wax - irrigated successfully previously. Counseled on using Debrox or Mineral oil to prevent recurrence.     Low Vit D - this was low on 5/12/14. Advised 2000 units daily.    Thrombocytopenia - this has been mild. Likely ITP. No issues with easy bruising or bleeding. No need for further investigation except follow periodically.    CBC RESULTS:   Recent Labs   Lab Test  03/18/16   1224   WBC  5.3   RBC  4.27*   HGB  14.0   HCT  40.8   MCV  96   MCH  32.8   MCHC  34.3   RDW  11.7   PLT  142*       CBC RESULTS:   Recent Labs    Lab Test  05/12/14  0825    WBC  4.2    RBC  4.29*    HGB  13.8    HCT  41.5    MCV  97    MCH  32.2    MCHC  33.3    RDW  12.3    PLT  134*        Preventive:    We don't have vaccine records yet but his foster mom says his vaccines are up to date. She will forward this from home.    Lipids:   Recent Labs    Lab Test  05/12/14  0825  05/18/13  1030    CHOL  162  179    HDL  54  53    LDL  93  104    TRIG  70  108    CHOLHDLRATIO  3.0  3.4      Diabetes test:     Last Basic Metabolic Panel:  NA      143   5/13/2015   POTASSIUM      4.0   5/13/2015  CHLORIDE      107   5/13/2015  CLAUDIO      9.4   5/13/2015  CO2       27   5/13/2015  BUN       12   5/13/2015  CR     0.82   5/13/2015  GLC       95   5/13/2015    Lipids screen:    Recent Labs   Lab Test  05/12/14   0825  05/18/13   1030   CHOL  162  179   HDL  54  53   LDL  93  104   TRIG  70  108   CHOLHDLRATIO  3.0  3.4       Advanced Directive: discussed but declined      OBJECTIVE:     There were no  vitals taken for this visit.  Previous EXAM:  GENERAL APPEARANCE: autistic patient, non verbal, with typical mannerism, cooperative in no distress. Here with his foster mom and brother - they are good about calming him down.   EYES: Eyes grossly normal to inspection, PERRL and conjunctivae and sclerae normal. There is baseline strabismus.  HENT:  mouth without ulcers or lesions  NECK: no adenopathy, no asymmetry, masses, or scars and thyroid normal to palpation  RESP: lungs clear to auscultation - no rales, rhonchi or wheezes  CV: regular rate and rhythm, normal S1 S2, no S3 or S4, no murmur, click or rub, no peripheral edema and peripheral pulses strong  ABDOMEN: soft, nontender, no hepatosplenomegaly, no masses and bowel sounds normal  MS: no gross musculoskeletal defects noted, no edema  SKIN: no suspicious lesions or rashes  NEURO: autistic patient, non verbal, with typical mannerism, cooperative in no distress.    Assessment and Plan - Decision Making    1. Weight loss    Per HPI        Written instructions given as follows:    Patient Instructions   Let me see Ryley on 4/22/21 for a physical with fasting labs.    Total time on the phone and reviewing records: 6 min

## 2021-04-22 ENCOUNTER — OFFICE VISIT (OUTPATIENT)
Dept: FAMILY MEDICINE | Facility: CLINIC | Age: 46
End: 2021-04-22
Payer: MEDICAID

## 2021-04-22 VITALS
HEART RATE: 94 BPM | HEIGHT: 63 IN | DIASTOLIC BLOOD PRESSURE: 80 MMHG | WEIGHT: 120 LBS | TEMPERATURE: 96.7 F | SYSTOLIC BLOOD PRESSURE: 130 MMHG | BODY MASS INDEX: 21.26 KG/M2

## 2021-04-22 DIAGNOSIS — Z00.00 ROUTINE GENERAL MEDICAL EXAMINATION AT A HEALTH CARE FACILITY: Primary | ICD-10-CM

## 2021-04-22 DIAGNOSIS — R63.4 WEIGHT LOSS: ICD-10-CM

## 2021-04-22 LAB
BASOPHILS # BLD AUTO: 0 10E9/L (ref 0–0.2)
BASOPHILS NFR BLD AUTO: 0.3 %
CHOLEST SERPL-MCNC: 185 MG/DL
DIFFERENTIAL METHOD BLD: ABNORMAL
EOSINOPHIL # BLD AUTO: 0.1 10E9/L (ref 0–0.7)
EOSINOPHIL NFR BLD AUTO: 2.3 %
ERYTHROCYTE [DISTWIDTH] IN BLOOD BY AUTOMATED COUNT: 11.8 % (ref 10–15)
HCT VFR BLD AUTO: 36.9 % (ref 40–53)
HDLC SERPL-MCNC: 70 MG/DL
HGB BLD-MCNC: 12.3 G/DL (ref 13.3–17.7)
LDLC SERPL CALC-MCNC: 102 MG/DL
LYMPHOCYTES # BLD AUTO: 1.3 10E9/L (ref 0.8–5.3)
LYMPHOCYTES NFR BLD AUTO: 43.9 %
MCH RBC QN AUTO: 32.2 PG (ref 26.5–33)
MCHC RBC AUTO-ENTMCNC: 33.3 G/DL (ref 31.5–36.5)
MCV RBC AUTO: 97 FL (ref 78–100)
MONOCYTES # BLD AUTO: 0.3 10E9/L (ref 0–1.3)
MONOCYTES NFR BLD AUTO: 10.5 %
NEUTROPHILS # BLD AUTO: 1.3 10E9/L (ref 1.6–8.3)
NEUTROPHILS NFR BLD AUTO: 43 %
NONHDLC SERPL-MCNC: 115 MG/DL
PLATELET # BLD AUTO: 133 10E9/L (ref 150–450)
RBC # BLD AUTO: 3.82 10E12/L (ref 4.4–5.9)
TRIGL SERPL-MCNC: 64 MG/DL
WBC # BLD AUTO: 3.1 10E9/L (ref 4–11)

## 2021-04-22 PROCEDURE — 85025 COMPLETE CBC W/AUTO DIFF WBC: CPT | Performed by: FAMILY MEDICINE

## 2021-04-22 PROCEDURE — 36415 COLL VENOUS BLD VENIPUNCTURE: CPT | Performed by: FAMILY MEDICINE

## 2021-04-22 PROCEDURE — 84134 ASSAY OF PREALBUMIN: CPT | Performed by: FAMILY MEDICINE

## 2021-04-22 PROCEDURE — 99396 PREV VISIT EST AGE 40-64: CPT | Performed by: FAMILY MEDICINE

## 2021-04-22 PROCEDURE — 80061 LIPID PANEL: CPT | Performed by: FAMILY MEDICINE

## 2021-04-22 ASSESSMENT — MIFFLIN-ST. JEOR: SCORE: 1319.45

## 2021-04-22 NOTE — PATIENT INSTRUCTIONS
1. I recommend including veggies, fresh fruits (3 to 5 servings), nuts (unsalted) in your daily diet. Cut down on carbs like bread, pasta, rice, potatoes. Limit red meats like burgers etc. Increase healthy proteins like beans, tofu, tuna, chicken and turkey.    2. Continue walking daily.     3. Apply Mineral Oil 2 drops each ear 3 times per week.    4. Have BB see the dentist and eye doctor regularly.    5. I will mail the results of the blood test. If all is well, let me see BB in one year.

## 2021-04-22 NOTE — PROGRESS NOTES
3  SUBJECTIVE:   CC: Teto Stephens is an 46 year old male who presents for preventive health visit.       Patient has been advised of split billing requirements and indicates understanding: Yes  Healthy Habits:    Do you get at least three servings of calcium containing foods daily (dairy, green leafy vegetables, etc.)? yes    Amount of exercise or daily activities, outside of work: 5 day(s) per week    Problems taking medications regularly No    Medication side effects: No    Have you had an eye exam in the past two years? no    Do you see a dentist twice per year? yes    Do you have sleep apnea, excessive snoring or daytime drowsiness?no    Ryley is a 46 year old male with history of autism, MR, non-verbal, male here for preventive visit.    His foster mom, Bridgett (his brother's wife) is mostly in charge of him. But he has adoptive mom who is involved in his care as well.    Weight loss - First noticed: a few months ago. Per our records, the weight loss has been as below. The weight loss was not intentional. Appetite/caloric intake has been unchanged. The patient has not had symptoms of depression or stress. No medications that would cause weight loss like stimulants. The patient has intestinal issues like abd pain, nausea, vomiting, diarrhea, blood in stool or black stools. No infectious symptoms like fevers, chills or night sweats. No neoplastic symptoms like lymph node enlargement. No skin or mucous membrane ulcers. No inflammatory symptoms like muscle pain, rashes, joint or bone pain. No chemical use like alcohol, tobacco, drugs.  Evaluation and treatment:    I discussed the diff dx of unintentional weight loss which can include reduced caloric intake(due to depression, stress, anorexia nervosa, dementia, abdominal angina - SMA stenosis) medications, malabsorption, infectious, neoplastic, inflammatory, chemical use.   At this time, the etiology is not clear but most likely appears to be - repetitive  "movements.   We discussed options for evaluation and treatment.    Previously CMP, CBC, ESR, TSH fine. Stool occult blood ordered previously but not done.   We discussed nutrition referral - but Bridgett and his PCA Lorrie said he eats \"a lot.\"   We will check prealbumin and go from there.   Bridgett asked about Ensure but since he eats well, this is likely not needed.    Wt Readings from Last 5 Encounters:   04/22/21 54.4 kg (120 lb)   06/10/20 56.7 kg (125 lb)   09/28/18 61.7 kg (136 lb)   08/01/18 59.4 kg (131 lb)   05/09/17 60.8 kg (134 lb)     TSH   Date Value Ref Range Status   03/27/2021 2.19 0.40 - 4.00 mU/L Final     Autism, MR - non verbal.    Needs full cares except he dresses himself.    Generally cooperative, but sometimes can be challenging if he is agitated.    Has certain behaviors like repetitive motions.    Follows with psychiatry is on Ativan, Clonidine and Risperdal.    Previous dental infection -    he had tooth extraction under anesthesia on 3/31/16.    He follows with dentist.    Seizure d/o -    follows with neurology.    On Tegretol and Depakote.    H/O Diabetes Insipidus -    previously on Lithium. Now resolved.    Ear wax -    irrigated successfully previously.    Counseled on using Mineral oil to prevent recurrence.     Low Vit D -    this was low on 5/12/14.    Normal on 3/27/21   Advised 2000 units daily.    Thrombocytopenia - No issues with easy bruising or bleeding.    this has been mild. Likely ITP. No need for further investigation except follow periodically.    CBC RESULTS:   Recent Labs   Lab Test  03/18/16   1224   WBC  5.3   RBC  4.27*   HGB  14.0   HCT  40.8   MCV  96   MCH  32.8   MCHC  34.3   RDW  11.7   PLT  142*       CBC RESULTS:   Recent Labs    Lab Test  05/12/14  0825    WBC  4.2    RBC  4.29*    HGB  13.8    HCT  41.5    MCV  97    MCH  32.2    MCHC  33.3    RDW  12.3    PLT  134*        Preventive:    Immunization History   Administered Date(s) Administered     " COVID-19,PF,Pfizer 03/26/2021, 04/16/2021     Influenza (IIV3) PF 12/11/2003, 11/12/2004, 12/18/2007, 11/10/2010, 11/02/2012, 01/06/2014     Influenza Vaccine IM > 6 months Valent IIV4 11/20/2019     TDAP Vaccine (Adacel) 02/21/2018       Diabetes screen:     Last Comprehensive Metabolic Panel:  Sodium   Date Value Ref Range Status   03/27/2021 140 133 - 144 mmol/L Final     Potassium   Date Value Ref Range Status   03/27/2021 3.4 3.4 - 5.3 mmol/L Final     Chloride   Date Value Ref Range Status   03/27/2021 106 94 - 109 mmol/L Final     Carbon Dioxide   Date Value Ref Range Status   03/27/2021 30 20 - 32 mmol/L Final     Anion Gap   Date Value Ref Range Status   03/27/2021 4 3 - 14 mmol/L Final     Glucose   Date Value Ref Range Status   03/27/2021 73 70 - 99 mg/dL Final     Comment:     Fasting specimen     Urea Nitrogen   Date Value Ref Range Status   03/27/2021 13 7 - 30 mg/dL Final     Creatinine   Date Value Ref Range Status   03/27/2021 0.85 0.66 - 1.25 mg/dL Final     GFR Estimate   Date Value Ref Range Status   03/27/2021 >90 >60 mL/min/[1.73_m2] Final     Comment:     Non  GFR Calc  Starting 12/18/2018, serum creatinine based estimated GFR (eGFR) will be   calculated using the Chronic Kidney Disease Epidemiology Collaboration   (CKD-EPI) equation.       Calcium   Date Value Ref Range Status   03/27/2021 9.4 8.5 - 10.1 mg/dL Final       Lipids screen:    Recent Labs   Lab Test 05/09/17  1727 05/12/14  0825 05/18/13  1030   CHOL  --  162 179   HDL  --  54 53   LDL 97 93 104   TRIG  --  70 108   CHOLHDLRATIO  --  3.0 3.4     Advanced Directive: discussed but declined    Today's PHQ-2 Score:   PHQ-2 ( 1999 Pfizer) 8/1/2018 5/9/2017   Q1: Little interest or pleasure in doing things 0 0   Q2: Feeling down, depressed or hopeless 0 0   PHQ-2 Score 0 0   Q1: Little interest or pleasure in doing things Not at all -   Q2: Feeling down, depressed or hopeless Not at all -   PHQ-2 Score 0 -       Abuse:  "Current or Past(Physical, Sexual or Emotional)- No  Do you feel safe in your environment? Yes        Social History     Tobacco Use     Smoking status: Never Smoker     Smokeless tobacco: Never Used   Substance Use Topics     Alcohol use: No     If you drink alcohol do you typically have >3 drinks per day or >7 drinks per week? No                      Last PSA: No results found for: PSA    Reviewed orders with patient. Reviewed health maintenance and updated orders accordingly - Yes      ROS:  CONSTITUTIONAL: NEGATIVE for fever, chills, change in weight  INTEGUMENTARY/SKIN: NEGATIVE for worrisome rashes, moles or lesions  EYES: NEGATIVE for vision changes or irritation  ENT: NEGATIVE for ear, mouth and throat problems  RESP: NEGATIVE for significant cough or SOB  CV: NEGATIVE for chest pain, palpitations or peripheral edema  GI: NEGATIVE for nausea, abdominal pain, heartburn, or change in bowel habits   male: negative for dysuria, hematuria, decreased urinary stream, erectile dysfunction, urethral discharge  MUSCULOSKELETAL: NEGATIVE for significant arthralgias or myalgia  NEURO: NEGATIVE for weakness, dizziness or paresthesias  PSYCHIATRIC: NEGATIVE for changes in mood or affect    OBJECTIVE:   /80   Pulse 94   Temp 96.7  F (35.9  C) (Tympanic)   Ht 1.6 m (5' 3\")   Wt 54.4 kg (120 lb)   BMI 21.26 kg/m    EXAM:    GENERAL APPEARANCE: autistic patient, non verbal, with typical mannerism, cooperative in no distress. Here with his foster mom, Bridgett and his PCA, Lorrie, they are good about calming him down.   EYES: Eyes grossly normal to inspection, PERRL and conjunctivae and sclerae normal. There is baseline strabismus.  HENT:  mouth without ulcers or lesions  NECK: no adenopathy, no asymmetry, masses, or scars and thyroid normal to palpation  RESP: lungs clear to auscultation - no rales, rhonchi or wheezes  CV: regular rate and rhythm, normal S1 S2, no S3 or S4, no murmur, click or rub, no peripheral " "edema and peripheral pulses strong  ABDOMEN: soft, nontender, no hepatosplenomegaly, no masses and bowel sounds normal  MS: no gross musculoskeletal defects noted, no edema  SKIN: no suspicious lesions or rashes  NEURO: autistic patient, non verbal, with typical mannerism, repetitive movements, cooperative in no distress.  : normal testicles. No obvious hernias.    ASSESSMENT/PLAN:     COUNSELING:  Reviewed preventive health counseling, as reflected in patient instructions       Regular exercise       Healthy diet/nutrition    Estimated body mass index is 22.14 kg/m  as calculated from the following:    Height as of 6/10/20: 1.6 m (5' 3\").    Weight as of 6/10/20: 56.7 kg (125 lb).        He reports that he has never smoked. He has never used smokeless tobacco.    Assessment and Plan - Decision Making    1. Routine general medical examination at a health care facility    Results of today's exam given to patient verbally along with age appropriate preventive measures. Written instructions reviewed and handed to the patient.    - CBC with platelets and differential  - Lipid panel reflex to direct LDL Non-fasting    2. Weight loss    Per HPI    - Prealbumin      Written instructions given as follows:    Patient Instructions   1. I recommend including veggies, fresh fruits (3 to 5 servings), nuts (unsalted) in your daily diet. Cut down on carbs like bread, pasta, rice, potatoes. Limit red meats like burgers etc. Increase healthy proteins like beans, tofu, tuna, chicken and turkey.    2. Continue walking daily.     3. Apply Mineral Oil 2 drops each ear 3 times per week.    4. Have BB see the dentist and eye doctor regularly.    5. I will mail the results of the blood test. If all is well, let me see BB in one year.         "

## 2021-04-22 NOTE — LETTER
April 26, 2021      Teto Stephens  [address] [facility] [address]        Ulisses Urias,     SURAJ's hemoglobin and platelets are a bit low but stable compared to previous. We will continue to monitor these yearly.     The prealbumin is helpful to determine nutrition status - BB's was normal. Continue to monitor his weight and let me know if you are concerned.     All other results were fine.     Regards,     Carlitos Gtz M.D.     Resulted Orders   CBC with platelets and differential   Result Value Ref Range    WBC 3.1 (L) 4.0 - 11.0 10e9/L    RBC Count 3.82 (L) 4.4 - 5.9 10e12/L    Hemoglobin 12.3 (L) 13.3 - 17.7 g/dL    Hematocrit 36.9 (L) 40.0 - 53.0 %    MCV 97 78 - 100 fl    MCH 32.2 26.5 - 33.0 pg    MCHC 33.3 31.5 - 36.5 g/dL    RDW 11.8 10.0 - 15.0 %    Platelet Count 133 (L) 150 - 450 10e9/L    % Neutrophils 43.0 %    % Lymphocytes 43.9 %    % Monocytes 10.5 %    % Eosinophils 2.3 %    % Basophils 0.3 %    Absolute Neutrophil 1.3 (L) 1.6 - 8.3 10e9/L    Absolute Lymphocytes 1.3 0.8 - 5.3 10e9/L    Absolute Monocytes 0.3 0.0 - 1.3 10e9/L    Absolute Eosinophils 0.1 0.0 - 0.7 10e9/L    Absolute Basophils 0.0 0.0 - 0.2 10e9/L    Diff Method Automated Method    Lipid panel reflex to direct LDL Non-fasting   Result Value Ref Range    Cholesterol 185 <200 mg/dL    Triglycerides 64 <150 mg/dL      Comment:      Non Fasting    HDL Cholesterol 70 >39 mg/dL    LDL Cholesterol Calculated 102 (H) <100 mg/dL      Comment:      Above desirable:  100-129 mg/dl  Borderline High:  130-159 mg/dL  High:             160-189 mg/dL  Very high:       >189 mg/dl      Non HDL Cholesterol 115 <130 mg/dL   Prealbumin   Result Value Ref Range    Prealbumin 31 15 - 45 mg/dL       If you have any questions or concerns, please call the clinic at the number listed above.       Sincerely,      Carlitos Gtz MD/sp

## 2021-04-23 LAB — PREALB SERPL IA-MCNC: 31 MG/DL (ref 15–45)

## 2021-04-23 NOTE — RESULT ENCOUNTER NOTE
Please mail letter:    Ulisses Urias,    SURAJ's hemoglobin and platelets are a bit low but stable compared to previous. We will continue to monitor these yearly.    The prealbumin is helpful to determine nutrition status - BB's was normal. Continue to monitor his weight and let me know if you are concerned.    All other results were fine.    Regards,    Carlitos Gtz M.D.

## 2022-09-12 ENCOUNTER — TRANSFERRED RECORDS (OUTPATIENT)
Dept: HEALTH INFORMATION MANAGEMENT | Facility: CLINIC | Age: 47
End: 2022-09-12

## 2022-12-21 ENCOUNTER — DOCUMENTATION ONLY (OUTPATIENT)
Dept: OTHER | Facility: CLINIC | Age: 47
End: 2022-12-21

## 2022-12-26 ENCOUNTER — OFFICE VISIT (OUTPATIENT)
Dept: FAMILY MEDICINE | Facility: CLINIC | Age: 47
End: 2022-12-26
Payer: MEDICAID

## 2022-12-26 VITALS
RESPIRATION RATE: 18 BRPM | SYSTOLIC BLOOD PRESSURE: 130 MMHG | WEIGHT: 124.6 LBS | HEIGHT: 63 IN | HEART RATE: 85 BPM | BODY MASS INDEX: 22.08 KG/M2 | DIASTOLIC BLOOD PRESSURE: 85 MMHG | OXYGEN SATURATION: 100 % | TEMPERATURE: 97.5 F

## 2022-12-26 DIAGNOSIS — Z00.00 ROUTINE GENERAL MEDICAL EXAMINATION AT A HEALTH CARE FACILITY: Primary | ICD-10-CM

## 2022-12-26 DIAGNOSIS — R32 URINARY INCONTINENCE, UNSPECIFIED TYPE: ICD-10-CM

## 2022-12-26 LAB
BASOPHILS # BLD AUTO: 0 10E3/UL (ref 0–0.2)
BASOPHILS NFR BLD AUTO: 1 %
EOSINOPHIL # BLD AUTO: 0.1 10E3/UL (ref 0–0.7)
EOSINOPHIL NFR BLD AUTO: 3 %
ERYTHROCYTE [DISTWIDTH] IN BLOOD BY AUTOMATED COUNT: 12.1 % (ref 10–15)
HBA1C MFR BLD: 4.9 % (ref 0–5.6)
HCT VFR BLD AUTO: 38.2 % (ref 40–53)
HGB BLD-MCNC: 13.1 G/DL (ref 13.3–17.7)
LYMPHOCYTES # BLD AUTO: 1.6 10E3/UL (ref 0.8–5.3)
LYMPHOCYTES NFR BLD AUTO: 45 %
MCH RBC QN AUTO: 32.6 PG (ref 26.5–33)
MCHC RBC AUTO-ENTMCNC: 34.3 G/DL (ref 31.5–36.5)
MCV RBC AUTO: 95 FL (ref 78–100)
MONOCYTES # BLD AUTO: 0.5 10E3/UL (ref 0–1.3)
MONOCYTES NFR BLD AUTO: 13 %
NEUTROPHILS # BLD AUTO: 1.4 10E3/UL (ref 1.6–8.3)
NEUTROPHILS NFR BLD AUTO: 39 %
PLATELET # BLD AUTO: 123 10E3/UL (ref 150–450)
RBC # BLD AUTO: 4.02 10E6/UL (ref 4.4–5.9)
WBC # BLD AUTO: 3.6 10E3/UL (ref 4–11)

## 2022-12-26 PROCEDURE — 84134 ASSAY OF PREALBUMIN: CPT | Performed by: PHYSICIAN ASSISTANT

## 2022-12-26 PROCEDURE — 99396 PREV VISIT EST AGE 40-64: CPT | Mod: 25 | Performed by: PHYSICIAN ASSISTANT

## 2022-12-26 PROCEDURE — 83036 HEMOGLOBIN GLYCOSYLATED A1C: CPT | Performed by: PHYSICIAN ASSISTANT

## 2022-12-26 PROCEDURE — 90686 IIV4 VACC NO PRSV 0.5 ML IM: CPT | Performed by: PHYSICIAN ASSISTANT

## 2022-12-26 PROCEDURE — 85025 COMPLETE CBC W/AUTO DIFF WBC: CPT | Performed by: PHYSICIAN ASSISTANT

## 2022-12-26 PROCEDURE — 90471 IMMUNIZATION ADMIN: CPT | Performed by: PHYSICIAN ASSISTANT

## 2022-12-26 PROCEDURE — 36415 COLL VENOUS BLD VENIPUNCTURE: CPT | Performed by: PHYSICIAN ASSISTANT

## 2022-12-26 RX ORDER — ZONISAMIDE 100 MG/1
100 CAPSULE ORAL DAILY
COMMUNITY

## 2022-12-26 ASSESSMENT — ENCOUNTER SYMPTOMS
JOINT SWELLING: 0
PALPITATIONS: 0
HEMATURIA: 0
SORE THROAT: 0
FEVER: 0
DIARRHEA: 0
EYE PAIN: 0
COUGH: 0
HEADACHES: 0
HEMATOCHEZIA: 0
NAUSEA: 0
SHORTNESS OF BREATH: 0
ARTHRALGIAS: 0
DIZZINESS: 0
ABDOMINAL PAIN: 0
FREQUENCY: 0
CHILLS: 0
CONSTIPATION: 0
HEARTBURN: 0
DYSURIA: 0
MYALGIAS: 0
WEAKNESS: 0
NERVOUS/ANXIOUS: 0
PARESTHESIAS: 0

## 2022-12-26 ASSESSMENT — PAIN SCALES - GENERAL: PAINLEVEL: NO PAIN (0)

## 2022-12-26 NOTE — LETTER
December 28, 2022      Dmitri Ochoa  [address] [facility] [address]        Dear ,    We are writing to inform you of your test results.    The results for Paula labs look good. The blood counts have improved and the prealbumin is stable. The average sugars are in a very good range as well. Overall, very good labs.       Resulted Orders   Prealbumin   Result Value Ref Range    Prealbumin 32 15 - 45 mg/dL   Hemoglobin A1c   Result Value Ref Range    Hemoglobin A1C 4.9 0.0 - 5.6 %      Comment:      Normal <5.7%   Prediabetes 5.7-6.4%    Diabetes 6.5% or higher     Note: Adopted from ADA consensus guidelines.   CBC with platelets and differential   Result Value Ref Range    WBC Count 3.6 (L) 4.0 - 11.0 10e3/uL    RBC Count 4.02 (L) 4.40 - 5.90 10e6/uL    Hemoglobin 13.1 (L) 13.3 - 17.7 g/dL    Hematocrit 38.2 (L) 40.0 - 53.0 %    MCV 95 78 - 100 fL    MCH 32.6 26.5 - 33.0 pg    MCHC 34.3 31.5 - 36.5 g/dL    RDW 12.1 10.0 - 15.0 %    Platelet Count 123 (L) 150 - 450 10e3/uL    % Neutrophils 39 %    % Lymphocytes 45 %    % Monocytes 13 %    % Eosinophils 3 %    % Basophils 1 %    Absolute Neutrophils 1.4 (L) 1.6 - 8.3 10e3/uL    Absolute Lymphocytes 1.6 0.8 - 5.3 10e3/uL    Absolute Monocytes 0.5 0.0 - 1.3 10e3/uL    Absolute Eosinophils 0.1 0.0 - 0.7 10e3/uL    Absolute Basophils 0.0 0.0 - 0.2 10e3/uL       If you have any questions or concerns, please call the clinic at the number listed above.       Sincerely,      Kyle Camacho PA-C

## 2022-12-26 NOTE — PROGRESS NOTES
"SUBJECTIVE:   CC: Dmitri is an 47 year old who presents for preventative health visit.       HPI    Would like a form filled out for handicap sticker. Dropped off paperwork a few weeks ago at the , they said they would \"set it aside\".     Wants a script for night time depends/pull ups        Today's PHQ-2 Score:   PHQ-2 ( 1999 Pfizer) 12/26/2022   Q1: Little interest or pleasure in doing things 0   Q2: Feeling down, depressed or hopeless 0   PHQ-2 Score 0   PHQ-2 Total Score (12-17 Years)- Positive if 3 or more points; Administer PHQ-A if positive -   Q1: Little interest or pleasure in doing things Not at all   Q2: Feeling down, depressed or hopeless Not at all   PHQ-2 Score 0           Social History     Tobacco Use     Smoking status: Never     Smokeless tobacco: Never   Substance Use Topics     Alcohol use: No         Alcohol Use 12/26/2022   Prescreen: >3 drinks/day or >7 drinks/week? No   Prescreen: >3 drinks/day or >7 drinks/week? -       Last PSA: No results found for: PSA    Reviewed orders with patient. Reviewed health maintenance and updated orders accordingly - Yes      Reviewed and updated as needed this visit by clinical staff   Tobacco  Allergies               Reviewed and updated as needed this visit by Provider                 History reviewed. No pertinent past medical history.   History reviewed. No pertinent surgical history.    Review of Systems  CONSTITUTIONAL: NEGATIVE for fever, chills, change in weight  INTEGUMENTARY/SKIN: NEGATIVE for worrisome rashes, moles or lesions  EYES: NEGATIVE for vision changes or irritation  ENT: NEGATIVE for ear, mouth and throat problems  RESP: NEGATIVE for significant cough or SOB  CV: NEGATIVE for chest pain, palpitations or peripheral edema  GI: NEGATIVE for nausea, abdominal pain, heartburn, or change in bowel habits   male: negative for dysuria, hematuria, decreased urinary stream, erectile dysfunction, urethral discharge  MUSCULOSKELETAL: " "NEGATIVE for significant arthralgias or myalgia  NEURO: NEGATIVE for weakness, dizziness or paresthesias  PSYCHIATRIC: NEGATIVE for changes in mood or affect    OBJECTIVE:   /85   Pulse 85   Temp 97.5  F (36.4  C) (Tympanic)   Resp 18   Ht 1.6 m (5' 3\")   Wt 56.5 kg (124 lb 9.6 oz)   SpO2 100%   BMI 22.07 kg/m      Physical Exam  GENERAL: healthy, alert and no distress  EYES: Eyes grossly normal to inspection, PERRL and conjunctivae and sclerae normal  NECK: no adenopathy, no asymmetry, masses, or scars and thyroid normal to palpation  RESP: lungs clear to auscultation - no rales, rhonchi or wheezes  CV: regular rate and rhythm, normal S1 S2, no S3 or S4, no murmur, click or rub, no peripheral edema and peripheral pulses strong  MS: no gross musculoskeletal defects noted, no edema        ASSESSMENT/PLAN:   (Z00.00) Routine general medical examination at a health care facility  (primary encounter diagnosis)  Comment: Due  Plan: CBC with platelets and differential,         Prealbumin, Hemoglobin A1c        Continue plan as directed. Checking CBC, prealbumin to monitor nutrition. Up a few pounds from the last visit which is great. Will increase portions slightly of solid foods and can use liquids as additional calories if needed    (R32) Urinary incontinence, unspecified type  Comment: New  Plan: Incontinence Supplies Order for DME - ONLY FOR         DME     Sent with script for depends-type pull-ups            COUNSELING:   Reviewed preventive health counseling, as reflected in patient instructions       Healthy diet/nutrition        He reports that he has never smoked. He has never used smokeless tobacco.            SEVERO BAH PA-C  [facility]  "

## 2022-12-27 LAB — PREALB SERPL IA-MCNC: 32 MG/DL (ref 15–45)

## 2023-01-10 ENCOUNTER — TELEPHONE (OUTPATIENT)
Dept: FAMILY MEDICINE | Facility: CLINIC | Age: 48
End: 2023-01-10

## 2023-01-10 NOTE — TELEPHONE ENCOUNTER
Reason for Call:  Form, our goal is to have forms completed with 72 hours, however, some forms may require a visit or additional information.    Type of letter, form or note:  disability    Who is the form from?: Patient    Where did the form come from: Patient or family brought in       What clinic location was the form placed at?: [facility]    Where the form was placed: Given to MA/RN    What number is listed as a contact on the form?: yes        Additional comments: disability form     Call taken on 1/10/2023 at 9:13 AM by Christine Eduardo

## 2023-01-11 NOTE — TELEPHONE ENCOUNTER
Form brought to the  for . Called and informed patient's mom that this was done.Sent a copy to be scanned into the chart.Elida Birmingham MA/PETER

## 2023-03-13 ENCOUNTER — TRANSFERRED RECORDS (OUTPATIENT)
Dept: HEALTH INFORMATION MANAGEMENT | Facility: CLINIC | Age: 48
End: 2023-03-13

## 2023-09-06 ENCOUNTER — TRANSFERRED RECORDS (OUTPATIENT)
Dept: HEALTH INFORMATION MANAGEMENT | Facility: CLINIC | Age: 48
End: 2023-09-06
Payer: MEDICAID

## 2024-01-09 DIAGNOSIS — Z79.899 HIGH RISK MEDICATION USE: Primary | ICD-10-CM

## 2024-01-28 ENCOUNTER — LAB (OUTPATIENT)
Dept: LAB | Facility: CLINIC | Age: 49
End: 2024-01-28
Payer: MEDICAID

## 2024-01-28 DIAGNOSIS — Z79.899 HIGH RISK MEDICATION USE: ICD-10-CM

## 2024-01-28 LAB — HBA1C MFR BLD: 4.9 % (ref 0–5.6)

## 2024-01-28 PROCEDURE — 80061 LIPID PANEL: CPT

## 2024-01-28 PROCEDURE — 82947 ASSAY GLUCOSE BLOOD QUANT: CPT

## 2024-01-28 PROCEDURE — 83036 HEMOGLOBIN GLYCOSYLATED A1C: CPT

## 2024-01-28 PROCEDURE — 36415 COLL VENOUS BLD VENIPUNCTURE: CPT

## 2024-01-28 PROCEDURE — 84146 ASSAY OF PROLACTIN: CPT

## 2024-01-29 LAB
CHOLEST SERPL-MCNC: 220 MG/DL
FASTING STATUS PATIENT QL REPORTED: YES
FASTING STATUS PATIENT QL REPORTED: YES
GLUCOSE SERPL-MCNC: 105 MG/DL (ref 70–99)
HDLC SERPL-MCNC: 81 MG/DL
LDLC SERPL CALC-MCNC: 125 MG/DL
NONHDLC SERPL-MCNC: 139 MG/DL
PROLACTIN SERPL 3RD IS-MCNC: 29 NG/ML (ref 4–15)
TRIGL SERPL-MCNC: 69 MG/DL

## 2024-08-23 NOTE — PATIENT INSTRUCTIONS
Patient Education   Preventive Care Advice   This is general advice given by our system to help you stay healthy. However, your care team may have specific advice just for you. Please talk to your care team about your preventive care needs.  Nutrition  Eat 5 or more servings of fruits and vegetables each day.  Try wheat bread, brown rice and whole grain pasta (instead of white bread, rice, and pasta).  Get enough calcium and vitamin D. Check the label on foods and aim for 100% of the RDA (recommended daily allowance).  Lifestyle  Exercise at least 150 minutes each week  (30 minutes a day, 5 days a week).  Do muscle strengthening activities 2 days a week. These help control your weight and prevent disease.  No smoking.  Wear sunscreen to prevent skin cancer.  Have a dental exam and cleaning every 6 months.  Yearly exams  See your health care team every year to talk about:  Any changes in your health.  Any medicines your care team has prescribed.  Preventive care, family planning, and ways to prevent chronic diseases.  Shots (vaccines)   HPV shots (up to age 26), if you've never had them before.  Hepatitis B shots (up to age 59), if you've never had them before.  COVID-19 shot: Get this shot when it's due.  Flu shot: Get a flu shot every year.  Tetanus shot: Get a tetanus shot every 10 years.  Pneumococcal, hepatitis A, and RSV shots: Ask your care team if you need these based on your risk.  Shingles shot (for age 50 and up)  General health tests  Diabetes screening:  Starting at age 35, Get screened for diabetes at least every 3 years.  If you are younger than age 35, ask your care team if you should be screened for diabetes.  Cholesterol test: At age 39, start having a cholesterol test every 5 years, or more often if advised.  Bone density scan (DEXA): At age 50, ask your care team if you should have this scan for osteoporosis (brittle bones).  Hepatitis C: Get tested at least once in your life.  STIs (sexually  transmitted infections)  Before age 24: Ask your care team if you should be screened for STIs.  After age 24: Get screened for STIs if you're at risk. You are at risk for STIs (including HIV) if:  You are sexually active with more than one person.  You don't use condoms every time.  You or a partner was diagnosed with a sexually transmitted infection.  If you are at risk for HIV, ask about PrEP medicine to prevent HIV.  Get tested for HIV at least once in your life, whether you are at risk for HIV or not.  Cancer screening tests  Cervical cancer screening: If you have a cervix, begin getting regular cervical cancer screening tests starting at age 21.  Breast cancer scan (mammogram): If you've ever had breasts, begin having regular mammograms starting at age 40. This is a scan to check for breast cancer.  Colon cancer screening: It is important to start screening for colon cancer at age 45.  Have a colonoscopy test every 10 years (or more often if you're at risk) Or, ask your provider about stool tests like a FIT test every year or Cologuard test every 3 years.  To learn more about your testing options, visit:   .  For help making a decision, visit:   https://bit.ly/dx77091.  Prostate cancer screening test: If you have a prostate, ask your care team if a prostate cancer screening test (PSA) at age 55 is right for you.  Lung cancer screening: If you are a current or former smoker ages 50 to 80, ask your care team if ongoing lung cancer screenings are right for you.  For informational purposes only. Not to replace the advice of your health care provider. Copyright   2023 [employer]. All rights reserved. Clinically reviewed by the [facility]. [employer] 911116 - REV 01/24.

## 2024-08-30 ENCOUNTER — OFFICE VISIT (OUTPATIENT)
Dept: FAMILY MEDICINE | Facility: CLINIC | Age: 49
End: 2024-08-30
Payer: MEDICAID

## 2024-08-30 VITALS
WEIGHT: 142 LBS | BODY MASS INDEX: 25.16 KG/M2 | RESPIRATION RATE: 14 BRPM | HEART RATE: 117 BPM | DIASTOLIC BLOOD PRESSURE: 84 MMHG | HEIGHT: 63 IN | SYSTOLIC BLOOD PRESSURE: 132 MMHG | TEMPERATURE: 98.1 F | OXYGEN SATURATION: 97 %

## 2024-08-30 DIAGNOSIS — Z00.00 ROUTINE GENERAL MEDICAL EXAMINATION AT A HEALTH CARE FACILITY: Primary | ICD-10-CM

## 2024-08-30 DIAGNOSIS — Z13.6 CARDIOVASCULAR SCREENING; LDL GOAL LESS THAN 160: ICD-10-CM

## 2024-08-30 DIAGNOSIS — Z13.1 SCREENING FOR DIABETES MELLITUS: ICD-10-CM

## 2024-08-30 LAB
ANION GAP SERPL CALCULATED.3IONS-SCNC: 10 MMOL/L (ref 7–15)
BUN SERPL-MCNC: 13.7 MG/DL (ref 6–20)
CALCIUM SERPL-MCNC: 9.9 MG/DL (ref 8.8–10.4)
CHLORIDE SERPL-SCNC: 105 MMOL/L (ref 98–107)
CHOLEST SERPL-MCNC: 209 MG/DL
CREAT SERPL-MCNC: 0.96 MG/DL (ref 0.67–1.17)
EGFRCR SERPLBLD CKD-EPI 2021: >90 ML/MIN/1.73M2
FASTING STATUS PATIENT QL REPORTED: NO
FASTING STATUS PATIENT QL REPORTED: NO
GLUCOSE SERPL-MCNC: 106 MG/DL (ref 70–99)
HBA1C MFR BLD: 4.8 % (ref 0–5.6)
HCO3 SERPL-SCNC: 27 MMOL/L (ref 22–29)
HDLC SERPL-MCNC: 56 MG/DL
LDLC SERPL CALC-MCNC: 132 MG/DL
NONHDLC SERPL-MCNC: 153 MG/DL
POTASSIUM SERPL-SCNC: 3.8 MMOL/L (ref 3.4–5.3)
SODIUM SERPL-SCNC: 142 MMOL/L (ref 135–145)
TRIGL SERPL-MCNC: 104 MG/DL

## 2024-08-30 PROCEDURE — 36415 COLL VENOUS BLD VENIPUNCTURE: CPT | Performed by: PHYSICIAN ASSISTANT

## 2024-08-30 PROCEDURE — 83036 HEMOGLOBIN GLYCOSYLATED A1C: CPT | Performed by: PHYSICIAN ASSISTANT

## 2024-08-30 PROCEDURE — 80061 LIPID PANEL: CPT | Performed by: PHYSICIAN ASSISTANT

## 2024-08-30 PROCEDURE — 80048 BASIC METABOLIC PNL TOTAL CA: CPT | Performed by: PHYSICIAN ASSISTANT

## 2024-08-30 PROCEDURE — 99396 PREV VISIT EST AGE 40-64: CPT | Performed by: PHYSICIAN ASSISTANT

## 2024-08-30 SDOH — HEALTH STABILITY: PHYSICAL HEALTH: ON AVERAGE, HOW MANY DAYS PER WEEK DO YOU ENGAGE IN MODERATE TO STRENUOUS EXERCISE (LIKE A BRISK WALK)?: 3 DAYS

## 2024-08-30 ASSESSMENT — PAIN SCALES - GENERAL: PAINLEVEL: NO PAIN (0)

## 2024-08-30 ASSESSMENT — SOCIAL DETERMINANTS OF HEALTH (SDOH): HOW OFTEN DO YOU GET TOGETHER WITH FRIENDS OR RELATIVES?: MORE THAN THREE TIMES A WEEK

## 2024-08-30 NOTE — PROGRESS NOTES
"Preventive Care Visit  [facility]  Kyle Camacho PA-C, Physician Assistant - Medical  Aug 30, 2024      Assessment & Plan     Routine general medical examination at a health care facility  Completed     Screening for diabetes mellitus  recheck  - Hemoglobin A1c; Future  - Basic metabolic panel  (Ca, Cl, CO2, Creat, Gluc, K, Na, BUN); Future  - Hemoglobin A1c  - Basic metabolic panel  (Ca, Cl, CO2, Creat, Gluc, K, Na, BUN)    CARDIOVASCULAR SCREENING; LDL GOAL LESS THAN 160  recheck  - Lipid panel reflex to direct LDL Fasting; Future  - Lipid panel reflex to direct LDL Fasting            BMI  Estimated body mass index is 25.15 kg/m  as calculated from the following:    Height as of this encounter: 1.6 m (5' 3\").    Weight as of this encounter: 64.4 kg (142 lb).   Weight management plan: Discussed healthy diet and exercise guidelines    Counseling  Appropriate preventive services were addressed with this patient via screening, questionnaire, or discussion as appropriate for fall prevention, nutrition, physical activity, Tobacco-use cessation, social engagement, weight loss and cognition.  Checklist reviewing preventive services available has been given to the patient.  Reviewed patient's diet, addressing concerns and/or questions.   He is at risk for lack of exercise and has been provided with information to increase physical activity for the benefit of his well-being.       Follow up yearly prevent visit     Maryam Rizvi is a 49 year old, presenting for the following:  Physical        8/30/2024     1:52 PM   Additional Questions   Roomed by Taz Mcghee MA   Accompanied by Self        Health Care Directive  Patient has a Health Care Directive on file      HPI            8/30/2024   General Health   How would you rate your overall physical health? Good   Feel stress (tense, anxious, or unable to sleep) Not at all            8/30/2024   Nutrition   Three or more servings of calcium each " Bed: 31  Expected date:   Expected time:   Means of arrival:   Comments:  Int 1   day? Yes   Diet: Regular (no restrictions)   How many servings of fruit and vegetables per day? (!) 0-1   How many sweetened beverages each day? 0-1            8/30/2024   Exercise   Days per week of moderate/strenous exercise 3 days            8/30/2024   Social Factors   Frequency of gathering with friends or relatives More than three times a week   Worry food won't last until get money to buy more No   Food not last or not have enough money for food? No   Do you have housing? (Housing is defined as stable permanent housing and does not include staying ouside in a car, in a tent, in an abandoned building, in an overnight shelter, or couch-surfing.) Yes   Are you worried about losing your housing? No   Lack of transportation? No   Unable to get utilities (heat,electricity)? No            8/30/2024   Dental   Dentist two times every year? Yes            8/30/2024   TB Screening   Were you born outside of the [place]? Yes            Today's PHQ-2 Score:       8/30/2024     2:05 PM   PHQ-2 ( 1999 [employer])   PHQ-2 Score Incomplete           8/30/2024   Substance Use   Alcohol more than 3/day or more than 7/wk Not Applicable   Do you use any other substances recreationally? No        Social History     Tobacco Use    Smoking status: Never    Smokeless tobacco: Never   Vaping Use    Vaping status: Never Used   Substance Use Topics    Alcohol use: No    Drug use: No             8/30/2024   One time HIV Screening   Previous HIV test? No          8/30/2024   STI Screening   New sexual partner(s) since last STI/HIV test? No      ASCVD Risk   The 10-year ASCVD risk score (Selina CAMILO, et al., 2019) is: 2.2%    Values used to calculate the score:      Age: 49 years      Sex: Male      Is Non- : No      Diabetic: No      Tobacco smoker: No      Systolic Blood Pressure: 132 mmHg      Is BP treated: No      HDL Cholesterol: 81 mg/dL      Total Cholesterol: 220 mg/dL        8/30/2024   Contraception/Family  "Planning   Questions about contraception or family planning No           Reviewed and updated as needed this visit by Provider                    History reviewed. No pertinent past medical history.  History reviewed. No pertinent surgical history.  Lab work is in process  Labs reviewed in EPIC      Review of Systems  Constitutional, HEENT, cardiovascular, pulmonary, gi and gu systems are negative, except as otherwise noted.     Objective    Exam  /84   Pulse 117   Temp 98.1  F (36.7  C) (Tympanic)   Resp 14   Ht 1.6 m (5' 3\")   Wt 64.4 kg (142 lb)   SpO2 97%   BMI 25.15 kg/m     Estimated body mass index is 25.15 kg/m  as calculated from the following:    Height as of this encounter: 1.6 m (5' 3\").    Weight as of this encounter: 64.4 kg (142 lb).    Physical Exam  GENERAL: alert and no distress  EYES: Eyes grossly normal to inspection, PERRL and conjunctivae and sclerae normal  HENT: ear canals and TM's normal, nose and mouth without ulcers or lesions  NECK: no adenopathy, no asymmetry, masses, or scars  RESP: lungs clear to auscultation - no rales, rhonchi or wheezes  CV: regular rate and rhythm, normal S1 S2, no S3 or S4, no murmur, click or rub, no peripheral edema  ABDOMEN: soft, nontender, no hepatosplenomegaly, no masses and bowel sounds normal  MS: no gross musculoskeletal defects noted, no edema  PSYCH: mentation appears normal, affect normal/bright        Signed Electronically by: Kyle Camacho PA-C    "

## 2024-08-30 NOTE — LETTER
September 9, 2024      Dmitri Ochoa  [address] [facility] [address]        Dear ,    We are writing to inform you of your test results.    Cholesterol is fine, not great. Focus on healthy eating and exercise.Blood sugar looks great as do kidneys.    Resulted Orders   Hemoglobin A1c   Result Value Ref Range    Hemoglobin A1C 4.8 0.0 - 5.6 %      Comment:      Normal <5.7%   Prediabetes 5.7-6.4%    Diabetes 6.5% or higher     Note: Adopted from ADA consensus guidelines.   Lipid panel reflex to direct LDL Fasting   Result Value Ref Range    Cholesterol 209 (H) <200 mg/dL    Triglycerides 104 <150 mg/dL    Direct Measure HDL 56 >=40 mg/dL    LDL Cholesterol Calculated 132 (H) <=100 mg/dL    Non HDL Cholesterol 153 (H) <130 mg/dL    Patient Fasting > 8hrs? No     Narrative    Cholesterol  Desirable:  <200 mg/dL    Triglycerides  Normal:  Less than 150 mg/dL  Borderline High:  150-199 mg/dL  High:  200-499 mg/dL  Very High:  Greater than or equal to 500 mg/dL    Direct Measure HDL  Female:  Greater than or equal to 50 mg/dL   Male:  Greater than or equal to 40 mg/dL    LDL Cholesterol  Desirable:  <100mg/dL  Above Desirable:  100-129 mg/dL   Borderline High:  130-159 mg/dL   High:  160-189 mg/dL   Very High:  >= 190 mg/dL    Non HDL Cholesterol  Desirable:  130 mg/dL  Above Desirable:  130-159 mg/dL  Borderline High:  160-189 mg/dL  High:  190-219 mg/dL  Very High:  Greater than or equal to 220 mg/dL   Basic metabolic panel  (Ca, Cl, CO2, Creat, Gluc, K, Na, BUN)   Result Value Ref Range    Sodium 142 135 - 145 mmol/L    Potassium 3.8 3.4 - 5.3 mmol/L    Chloride 105 98 - 107 mmol/L    Carbon Dioxide (CO2) 27 22 - 29 mmol/L    Anion Gap 10 7 - 15 mmol/L    Urea Nitrogen 13.7 6.0 - 20.0 mg/dL    Creatinine 0.96 0.67 - 1.17 mg/dL    GFR Estimate >90 >60 mL/min/1.73m2      Comment:      eGFR calculated using 2021 CKD-EPI equation.    Calcium 9.9 8.8 - 10.4 mg/dL      Comment:      Reference intervals  for this test were updated on 7/16/2024 to reflect our healthy population more accurately. There may be differences in the flagging of prior results with similar values performed with this method. Those prior results can be interpreted in the context of the updated reference intervals.    Glucose 106 (H) 70 - 99 mg/dL    Patient Fasting > 8hrs? No        If you have any questions or concerns, please call the clinic at the number listed above.       Sincerely,      Kyle Camacho PA-C

## 2024-10-17 ENCOUNTER — TELEPHONE (OUTPATIENT)
Dept: FAMILY MEDICINE | Facility: CLINIC | Age: 49
End: 2024-10-17
Payer: MEDICAID

## 2024-10-17 DIAGNOSIS — R32 URINARY INCONTINENCE, UNSPECIFIED TYPE: Primary | ICD-10-CM

## 2024-10-17 NOTE — TELEPHONE ENCOUNTER
Order/Referral Request    Who is requesting: Mom    Orders being requested: Adult pull ups-patient weighs #150, mom thinks he willl need size medium    Reason service is needed/diagnosis: dementia    When are orders needed by: ASAP    Has this been discussed with Provider: No    Does patient have a preference on a Group/Provider/Facility? [facility]    Does patient have an appointment scheduled?: No    Where to send orders:  Fax maybe?    Okay to leave a detailed message?: Yes at Other phone number:  [phone]    Elida [place] [facility]

## 2025-01-22 ENCOUNTER — MEDICAL CORRESPONDENCE (OUTPATIENT)
Dept: HEALTH INFORMATION MANAGEMENT | Facility: CLINIC | Age: 50
End: 2025-01-22

## 2025-08-27 ENCOUNTER — TELEPHONE (OUTPATIENT)
Dept: FAMILY MEDICINE | Facility: CLINIC | Age: 50
End: 2025-08-27
Payer: MEDICAID